# Patient Record
Sex: MALE | Race: BLACK OR AFRICAN AMERICAN | NOT HISPANIC OR LATINO | Employment: OTHER | URBAN - METROPOLITAN AREA
[De-identification: names, ages, dates, MRNs, and addresses within clinical notes are randomized per-mention and may not be internally consistent; named-entity substitution may affect disease eponyms.]

---

## 2017-03-29 ENCOUNTER — RESULTS ENCOUNTER (OUTPATIENT)
Dept: ONCOLOGY | Facility: CLINIC | Age: 67
End: 2017-03-29

## 2017-03-29 DIAGNOSIS — G89.4 CHRONIC PAIN SYNDROME: ICD-10-CM

## 2017-03-29 DIAGNOSIS — D47.2 IGG MONOCLONAL PROTEIN DISORDER: ICD-10-CM

## 2017-04-11 ENCOUNTER — LAB (OUTPATIENT)
Dept: LAB | Facility: HOSPITAL | Age: 67
End: 2017-04-11

## 2017-04-11 ENCOUNTER — OFFICE VISIT (OUTPATIENT)
Dept: ONCOLOGY | Facility: CLINIC | Age: 67
End: 2017-04-11

## 2017-04-11 VITALS
HEIGHT: 72 IN | DIASTOLIC BLOOD PRESSURE: 77 MMHG | HEART RATE: 47 BPM | TEMPERATURE: 97.6 F | WEIGHT: 163 LBS | SYSTOLIC BLOOD PRESSURE: 148 MMHG | RESPIRATION RATE: 14 BRPM | BODY MASS INDEX: 22.08 KG/M2

## 2017-04-11 DIAGNOSIS — D47.2 IGG MONOCLONAL PROTEIN DISORDER: Primary | ICD-10-CM

## 2017-04-11 DIAGNOSIS — G89.4 CHRONIC PAIN SYNDROME: ICD-10-CM

## 2017-04-11 DIAGNOSIS — I15.9 SECONDARY HYPERTENSION: ICD-10-CM

## 2017-04-11 LAB
ALBUMIN SERPL-MCNC: 4.6 G/DL (ref 3.2–4.8)
ALBUMIN/GLOB SERPL: 1.1 G/DL (ref 1.5–2.5)
ALP SERPL-CCNC: 61 U/L (ref 25–100)
ALT SERPL W P-5'-P-CCNC: 22 U/L (ref 7–40)
ANION GAP SERPL CALCULATED.3IONS-SCNC: -2 MMOL/L (ref 3–11)
AST SERPL-CCNC: 16 U/L (ref 0–33)
BILIRUB SERPL-MCNC: 0.6 MG/DL (ref 0.3–1.2)
BUN BLD-MCNC: 10 MG/DL (ref 9–23)
BUN/CREAT SERPL: 10 (ref 7–25)
CALCIUM SPEC-SCNC: 9.9 MG/DL (ref 8.7–10.4)
CHLORIDE SERPL-SCNC: 112 MMOL/L (ref 99–109)
CO2 SERPL-SCNC: 33 MMOL/L (ref 20–31)
CREAT BLD-MCNC: 1 MG/DL (ref 0.6–1.3)
ERYTHROCYTE [DISTWIDTH] IN BLOOD BY AUTOMATED COUNT: 14.8 % (ref 11.3–14.5)
GFR SERPL CREATININE-BSD FRML MDRD: 91 ML/MIN/1.73
GLOBULIN UR ELPH-MCNC: 4.1 GM/DL
GLUCOSE BLD-MCNC: 95 MG/DL (ref 70–100)
HCT VFR BLD AUTO: 44.5 % (ref 38.9–50.9)
HGB BLD-MCNC: 14.4 G/DL (ref 13.1–17.5)
LYMPHOCYTES # BLD AUTO: 1.5 10*3/MM3 (ref 0.6–4.8)
LYMPHOCYTES NFR BLD AUTO: 52.2 % (ref 24–44)
MCH RBC QN AUTO: 29.7 PG (ref 27–31)
MCHC RBC AUTO-ENTMCNC: 32.5 G/DL (ref 32–36)
MCV RBC AUTO: 91.5 FL (ref 80–99)
MONOCYTES # BLD AUTO: 0.2 10*3/MM3 (ref 0–1)
MONOCYTES NFR BLD AUTO: 6.2 % (ref 0–12)
NEUTROPHILS # BLD AUTO: 1.2 10*3/MM3 (ref 1.5–8.3)
NEUTROPHILS NFR BLD AUTO: 41.6 % (ref 41–71)
PLATELET # BLD AUTO: 291 10*3/MM3 (ref 150–450)
PMV BLD AUTO: 7.2 FL (ref 6–12)
POTASSIUM BLD-SCNC: 4.7 MMOL/L (ref 3.5–5.5)
PROT SERPL-MCNC: 8.7 G/DL (ref 5.7–8.2)
RBC # BLD AUTO: 4.86 10*6/MM3 (ref 4.2–5.76)
SODIUM BLD-SCNC: 143 MMOL/L (ref 132–146)
WBC NRBC COR # BLD: 2.8 10*3/MM3 (ref 3.5–10.8)

## 2017-04-11 PROCEDURE — 99213 OFFICE O/P EST LOW 20 MIN: CPT | Performed by: NURSE PRACTITIONER

## 2017-04-11 PROCEDURE — 84155 ASSAY OF PROTEIN SERUM: CPT

## 2017-04-11 PROCEDURE — 36415 COLL VENOUS BLD VENIPUNCTURE: CPT

## 2017-04-11 PROCEDURE — 80053 COMPREHEN METABOLIC PANEL: CPT

## 2017-04-11 PROCEDURE — 86334 IMMUNOFIX E-PHORESIS SERUM: CPT

## 2017-04-11 PROCEDURE — 85025 COMPLETE CBC W/AUTO DIFF WBC: CPT

## 2017-04-11 PROCEDURE — 84165 PROTEIN E-PHORESIS SERUM: CPT

## 2017-04-11 PROCEDURE — 83883 ASSAY NEPHELOMETRY NOT SPEC: CPT

## 2017-04-11 NOTE — PROGRESS NOTES
"      PROBLEM LIST:  1. Monoclonal protein diagnosed in  previously followed by Dr. Usama Jones, IgG:   a) Bone marrow biopsy on 03/10/2005 showed a normocellular bone marrow with  normal hematopoiesis with no evidence of multiple myeloma or lymphoma, plasma  cells 3%.   b) Reevaluation on 2014 showed a normal CBC, normal calcium, normal  kidney function, IgG lambda monoclonal protein of 1.6 g/dL, kappa lambda ratio  of 0.15, spot urine immunofixation with IgG lambda.   c) Repeat bone marrow biopsy on 2016 showed a normocellular marrow with  3 to 5% plasma cells. The plasma cells had a mild relative lambda excess.  There was no evidence of amyloid deposition on Congo red stain.  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  2. Chronic pain.   3. Hypertension.    Subjective     HISTORY OF PRESENT ILLNESS:   Jh Bryan returns for follow-up.   He says he has been having some pain in his right shoulder and behind the right shoulder blade for about a couple of months.  He still has normal range of motion but he just feels like an aching muscle. Otherwise he reports feeling tired.  He stopped working at the [employer].  He also complains of anxiety and depression.  His wife had a stroke and his mother in law  in Feb.  He stated he had an enlarged prostate and was taking OTC medications but it did not help.  He has not been back to see his primary care doctor.    Past Medical History, Past Surgical History, Social History, Family History have been reviewed and are without significant changes except as mentioned.    Review of Systems   A comprehensive 14 point review of systems was performed and was negative except as mentioned.    Medications:  The current medication list was reviewed in the EMR    ALLERGIES:  No Known Allergies    Objective      /77  Pulse (!) 47  Temp 97.6 °F (36.4 °C)  Resp 14  Ht 72\" (182.9 cm)  Wt 163 lb (73.9 kg)  BMI 22.11 kg/m2     Performance Status: 1    General: " well appearing, in no acute distress  HEENT: sclera anicteric, oropharynx clear  Lymphatics: no cervical, supraclavicular, or axillary adenopathy  Cardiovascular: regular rate and rhythm, no murmurs  Lungs: clear to auscultation bilaterally  Abdomen: Left upper quadrant pain and tenderness on palpation.  No palpable organomegaly  Extremeties: no lower extremity edema  Skin: no rashes, lesions, bruising, or petechiae  Musculoskeletal: There is no pain with palpation or percussion over the left shoulder blade, or ribs  Psych: mood and affect appropriate    RECENT LABS:  CBC is notable for WBC is 2.8 but this is stable when compared to previous labs, otherwise stable. I will call him with lab work when results are available.        Assessment/Plan   Jh Bryan is a 66 y.o. year old male with IgG monoclonal gammopathy who returns for six-month follow-up.  He is complaining of some right shoulder pain today and left upper quadrant pain that he states can cause spasms.   I encouraged him to make an appointment with his primary care doctor to discuss his concerns regarding shoulder pain, left upper quadrant pain, as well as anxiety. His lab work was not back today during his visit.  I will call him with the results. Follow up in 6 months as long as his monoclonal protein is stable.                  Visit time was 25 minutes, greater than 50% spent in counseling      Amy CHEUNG  [facility]    4/11/2017          CC:

## 2017-04-12 LAB
ALBUMIN SERPL-MCNC: 4.3 G/DL (ref 2.9–4.4)
ALBUMIN/GLOB SERPL: 1.1 {RATIO} (ref 0.7–1.7)
ALPHA1 GLOB FLD ELPH-MCNC: 0.3 G/DL (ref 0–0.4)
ALPHA2 GLOB SERPL ELPH-MCNC: 0.7 G/DL (ref 0.4–1)
B-GLOBULIN SERPL ELPH-MCNC: 1 G/DL (ref 0.7–1.3)
GAMMA GLOB SERPL ELPH-MCNC: 2.4 G/DL (ref 0.4–1.8)
GLOBULIN SER CALC-MCNC: 4.3 G/DL (ref 2.2–3.9)
IGA SERPL-MCNC: 33 MG/DL (ref 61–437)
IGG SERPL-MCNC: 2484 MG/DL (ref 700–1600)
IGM SERPL-MCNC: 24 MG/DL (ref 20–172)
INTERPRETATION SERPL IEP-IMP: ABNORMAL
KAPPA LC SERPL-MCNC: 10.21 MG/L (ref 3.3–19.4)
KAPPA LC/LAMBDA SER: 0.09 {RATIO} (ref 0.26–1.65)
LAMBDA LC FREE SERPL-MCNC: 113.9 MG/L (ref 5.71–26.3)
Lab: ABNORMAL
M-SPIKE: 2.1 G/DL
PROT SERPL-MCNC: 8.6 G/DL (ref 6–8.5)

## 2017-04-17 ENCOUNTER — TELEPHONE (OUTPATIENT)
Dept: INFUSION THERAPY | Facility: HOSPITAL | Age: 67
End: 2017-04-17

## 2017-04-17 NOTE — TELEPHONE ENCOUNTER
After multiple attempts by phone I spoke with Mr. Bryan today and explained that his M-spike is slightly higher than last time.  We will see him back in 3 months instead of 6 months.  I spoke with Luis in scheduling and she will call the patient to schedule a 3 month follow up visit.

## 2017-06-13 ENCOUNTER — OFFICE VISIT (OUTPATIENT)
Dept: ONCOLOGY | Facility: CLINIC | Age: 67
End: 2017-06-13

## 2017-06-13 ENCOUNTER — LAB (OUTPATIENT)
Dept: LAB | Facility: HOSPITAL | Age: 67
End: 2017-06-13

## 2017-06-13 VITALS
TEMPERATURE: 98 F | HEART RATE: 54 BPM | SYSTOLIC BLOOD PRESSURE: 191 MMHG | RESPIRATION RATE: 16 BRPM | HEIGHT: 72 IN | BODY MASS INDEX: 21.94 KG/M2 | WEIGHT: 162 LBS | DIASTOLIC BLOOD PRESSURE: 91 MMHG

## 2017-06-13 DIAGNOSIS — D47.2 IGG MONOCLONAL PROTEIN DISORDER: ICD-10-CM

## 2017-06-13 DIAGNOSIS — D47.2 IGG MONOCLONAL PROTEIN DISORDER: Primary | ICD-10-CM

## 2017-06-13 LAB
ALBUMIN SERPL-MCNC: 4.4 G/DL (ref 3.2–4.8)
ALBUMIN/GLOB SERPL: 1.1 G/DL (ref 1.5–2.5)
ALP SERPL-CCNC: 62 U/L (ref 25–100)
ALT SERPL W P-5'-P-CCNC: 20 U/L (ref 7–40)
ANION GAP SERPL CALCULATED.3IONS-SCNC: 2 MMOL/L (ref 3–11)
AST SERPL-CCNC: 19 U/L (ref 0–33)
BILIRUB SERPL-MCNC: 0.5 MG/DL (ref 0.3–1.2)
BUN BLD-MCNC: 11 MG/DL (ref 9–23)
BUN/CREAT SERPL: 12.2 (ref 7–25)
CALCIUM SPEC-SCNC: 9.7 MG/DL (ref 8.7–10.4)
CHLORIDE SERPL-SCNC: 109 MMOL/L (ref 99–109)
CO2 SERPL-SCNC: 30 MMOL/L (ref 20–31)
CREAT BLD-MCNC: 0.9 MG/DL (ref 0.6–1.3)
ERYTHROCYTE [DISTWIDTH] IN BLOOD BY AUTOMATED COUNT: 13.8 % (ref 11.3–14.5)
GFR SERPL CREATININE-BSD FRML MDRD: 103 ML/MIN/1.73
GLOBULIN UR ELPH-MCNC: 4 GM/DL
GLUCOSE BLD-MCNC: 102 MG/DL (ref 70–100)
HCT VFR BLD AUTO: 40.9 % (ref 38.9–50.9)
HGB BLD-MCNC: 13.5 G/DL (ref 13.1–17.5)
LYMPHOCYTES # BLD AUTO: 1.5 10*3/MM3 (ref 0.6–4.8)
LYMPHOCYTES NFR BLD AUTO: 50.2 % (ref 24–44)
MCH RBC QN AUTO: 29.7 PG (ref 27–31)
MCHC RBC AUTO-ENTMCNC: 33 G/DL (ref 32–36)
MCV RBC AUTO: 90.2 FL (ref 80–99)
MONOCYTES # BLD AUTO: 0.3 10*3/MM3 (ref 0–1)
MONOCYTES NFR BLD AUTO: 8.7 % (ref 0–12)
NEUTROPHILS # BLD AUTO: 1.2 10*3/MM3 (ref 1.5–8.3)
NEUTROPHILS NFR BLD AUTO: 41.1 % (ref 41–71)
PLATELET # BLD AUTO: 283 10*3/MM3 (ref 150–450)
PMV BLD AUTO: 7.2 FL (ref 6–12)
POTASSIUM BLD-SCNC: 4.1 MMOL/L (ref 3.5–5.5)
PROT SERPL-MCNC: 8.4 G/DL (ref 5.7–8.2)
RBC # BLD AUTO: 4.54 10*6/MM3 (ref 4.2–5.76)
SODIUM BLD-SCNC: 141 MMOL/L (ref 132–146)
WBC NRBC COR # BLD: 2.9 10*3/MM3 (ref 3.5–10.8)

## 2017-06-13 PROCEDURE — 84165 PROTEIN E-PHORESIS SERUM: CPT | Performed by: NURSE PRACTITIONER

## 2017-06-13 PROCEDURE — 83883 ASSAY NEPHELOMETRY NOT SPEC: CPT | Performed by: NURSE PRACTITIONER

## 2017-06-13 PROCEDURE — 85025 COMPLETE CBC W/AUTO DIFF WBC: CPT

## 2017-06-13 PROCEDURE — 86334 IMMUNOFIX E-PHORESIS SERUM: CPT | Performed by: NURSE PRACTITIONER

## 2017-06-13 PROCEDURE — 99214 OFFICE O/P EST MOD 30 MIN: CPT | Performed by: INTERNAL MEDICINE

## 2017-06-13 PROCEDURE — 36415 COLL VENOUS BLD VENIPUNCTURE: CPT

## 2017-06-13 PROCEDURE — 84155 ASSAY OF PROTEIN SERUM: CPT | Performed by: NURSE PRACTITIONER

## 2017-06-13 PROCEDURE — 80053 COMPREHEN METABOLIC PANEL: CPT | Performed by: NURSE PRACTITIONER

## 2017-06-13 NOTE — PROGRESS NOTES
"      PROBLEM LIST:  1. Monoclonal protein diagnosed in 2005 previously followed by Dr. Usama Jones, IgG:   a) Bone marrow biopsy on 03/10/2005 showed a normocellular bone marrow with  normal hematopoiesis with no evidence of multiple myeloma or lymphoma, plasma  cells 3%.   b) Reevaluation on 09/16/2014 showed a normal CBC, normal calcium, normal  kidney function, IgG lambda monoclonal protein of 1.6 g/dL, kappa lambda ratio  of 0.15, spot urine immunofixation with IgG lambda.   c) Repeat bone marrow biopsy on 03/01/2016 showed a normocellular marrow with  3 to 5% plasma cells. The plasma cells had a mild relative lambda excess.  There was no evidence of amyloid deposition on Congo red stain.  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  2. Chronic pain.   3. Hypertension.    Subjective     HISTORY OF PRESENT ILLNESS:   Jh Bryan returns for follow-up.   He says he is not feeling great.  His is having pain in his shoulders and legs, and also reports pain in his left low back.  He is frustrated about not getting his pain medications.  He also has not taken his blood pressure medicine for the past few weeks.    Past Medical History, Past Surgical History, Social History, Family History have been reviewed and are without significant changes except as mentioned.    Review of Systems   A comprehensive 14 point review of systems was performed and was negative except as mentioned.    Medications:  The current medication list was reviewed in the EMR    ALLERGIES:  No Known Allergies    Objective      BP (!) 191/91 Comment: Dr العلي stopped medications, pt aggravated  Pulse 54  Temp 98 °F (36.7 °C) (Temporal Artery )   Resp 16  Ht 72\" (182.9 cm)  Wt 162 lb (73.5 kg)  BMI 21.97 kg/m2     Performance Status: 1    General: well appearing, in no acute distress  HEENT: sclera anicteric, oropharynx clear  Lymphatics: no cervical, supraclavicular, or axillary adenopathy  Cardiovascular: regular rate and rhythm, no " murmurs  Lungs: clear to auscultation bilaterally  Abdomen: Left upper quadrant pain and tenderness on palpation.  No palpable organomegaly  Extremeties: no lower extremity edema  Skin: no rashes, lesions, bruising, or petechiae  Musculoskeletal: There is no pain with palpation or percussion over the left shoulder blade, or ribs  Psych: mood and affect appropriate    RECENT LABS:  Wbc 2.9, hgb 13.5, plt 283.  Ca 9.7, Cr 0.9.  Total protein 8.4        Assessment/Plan   Jh Bryan is a 66 y.o. year old male with IgG monoclonal gammopathy who returns for 3 month follow up.  His last M-spike was slightly increased.  Today his total protein is stable, but we will await the SPEP.  If it is increasing I will plan to repeat a skeletal survey or possibly a bone marrow biopsy.  At this point he has never met criteria for a diagnosis of myeloma.  If his M-spike is stable we will see him and check it again in 3 months.    We discussed that he needs to follow up with his PCP regarding his blood pressure medications.                    Visit time was 25 minutes, greater than 50% spent in counseling      6/13/2017          CC:

## 2017-06-14 LAB
ALBUMIN SERPL-MCNC: 4.2 G/DL (ref 2.9–4.4)
ALBUMIN/GLOB SERPL: 1 {RATIO} (ref 0.7–1.7)
ALPHA1 GLOB FLD ELPH-MCNC: 0.2 G/DL (ref 0–0.4)
ALPHA2 GLOB SERPL ELPH-MCNC: 0.6 G/DL (ref 0.4–1)
B-GLOBULIN SERPL ELPH-MCNC: 1 G/DL (ref 0.7–1.3)
GAMMA GLOB SERPL ELPH-MCNC: 2.4 G/DL (ref 0.4–1.8)
GLOBULIN SER CALC-MCNC: 4.3 G/DL (ref 2.2–3.9)
IGA SERPL-MCNC: 32 MG/DL (ref 61–437)
IGG SERPL-MCNC: 2608 MG/DL (ref 700–1600)
IGM SERPL-MCNC: 26 MG/DL (ref 20–172)
INTERPRETATION SERPL IEP-IMP: ABNORMAL
KAPPA LC SERPL-MCNC: 8.5 MG/L (ref 3.3–19.4)
KAPPA LC/LAMBDA SER: 0.07 {RATIO} (ref 0.26–1.65)
LAMBDA LC FREE SERPL-MCNC: 129.1 MG/L (ref 5.7–26.3)
Lab: ABNORMAL
M-SPIKE: 2.1 G/DL
PROT SERPL-MCNC: 8.5 G/DL (ref 6–8.5)

## 2017-06-16 ENCOUNTER — TELEPHONE (OUTPATIENT)
Dept: ONCOLOGY | Facility: CLINIC | Age: 67
End: 2017-06-16

## 2017-06-16 NOTE — TELEPHONE ENCOUNTER
I spoke with Mr. Bryan and informed him that his labs are stable and we will see him in 3 months.  He has followed up with his PCP and has started back on his blood pressure medication, a muscle relaxer, and an anti inflammatory medication.  He will follow up with his PCP next week.

## 2017-08-22 ENCOUNTER — HOSPITAL ENCOUNTER (EMERGENCY)
Facility: HOSPITAL | Age: 67
Discharge: HOME | End: 2017-08-22
Attending: EMERGENCY MEDICINE | Admitting: EMERGENCY MEDICINE

## 2017-08-22 VITALS
WEIGHT: 165 LBS | DIASTOLIC BLOOD PRESSURE: 74 MMHG | BODY MASS INDEX: 22.35 KG/M2 | HEIGHT: 72 IN | HEART RATE: 51 BPM | RESPIRATION RATE: 20 BRPM | TEMPERATURE: 98 F | SYSTOLIC BLOOD PRESSURE: 152 MMHG | OXYGEN SATURATION: 100 %

## 2017-08-22 DIAGNOSIS — T63.441A BEE STING REACTION, ACCIDENTAL OR UNINTENTIONAL, INITIAL ENCOUNTER: Primary | ICD-10-CM

## 2017-08-22 PROCEDURE — 99282 EMERGENCY DEPT VISIT SF MDM: CPT

## 2017-08-22 RX ORDER — METHYLPREDNISOLONE 4 MG/1
TABLET ORAL
Qty: 21 TABLET | Refills: 0 | OUTPATIENT
Start: 2017-08-22 | End: 2020-03-27

## 2017-08-22 NOTE — DISCHARGE INSTRUCTIONS
Continue benadryl as directed.  Medrol dose pack as prescribed.  Apply ice bag off/on as needed.  Return if worse.

## 2017-08-22 NOTE — ED PROVIDER NOTES
Subjective   HPI Comments: 67-year-old male presents to the emergency department with complaints of swelling to the right facial cheek, both hands and right lower leg after being stung multiple times by bees yesterday while cutting the grass.  He denies any shortness of breath or swelling of his tongue or throat.  He has been taking Benadryl with little to no relief.  No known allergy to bee stings.  Past medical history of hypertension and monoclonal gammopathy, followed by Dr. Balderas.      Patient is a 67 y.o. male presenting with allergic reaction.   Allergic Reaction   Presenting symptoms: swelling (right face, hands, right ankle)    Presenting symptoms: no rash and no wheezing    Severity:  Moderate  Duration: onset yesterday after being stung by bees.  Context: insect bite/sting    Relieved by:  Nothing  Worsened by:  Nothing  Ineffective treatments:  Antihistamines      Review of Systems   Constitutional: Negative for chills and fever.   HENT: Negative for congestion, ear pain, nosebleeds, rhinorrhea and sore throat.         Swelling of right facial cheek     Eyes: Negative for pain, discharge and visual disturbance.   Respiratory: Negative for cough, shortness of breath and wheezing.    Cardiovascular: Negative for chest pain, palpitations and leg swelling.   Gastrointestinal: Negative for abdominal pain, blood in stool, diarrhea, nausea and vomiting.   Endocrine: Negative.    Genitourinary: Negative for dysuria, hematuria and urgency.   Musculoskeletal: Negative for arthralgias and back pain.   Skin: Negative for pallor and rash.   Allergic/Immunologic: Negative for immunocompromised state.   Neurological: Negative for dizziness, speech difficulty, weakness and headaches.   Hematological: Negative for adenopathy. Does not bruise/bleed easily.   Psychiatric/Behavioral: Negative.        Past Medical History:   Diagnosis Date   • Multiple myeloma        No Known Allergies    Past Surgical History:   Procedure  Laterality Date   • BONE MARROW BIOPSY  03/10/2005    Showed normocellular bone marrow with normal hematpoiesis.   • TOOTH EXTRACTION Right 11/2016    bottom right tooth 2 or 3rd from the middle to the right       History reviewed. No pertinent family history.    Social History     Social History   • Marital status:      Spouse name: N/A   • Number of children: N/A   • Years of education: N/A     Social History Main Topics   • Smoking status: Former Smoker   • Smokeless tobacco: Never Used   • Alcohol use No   • Drug use: Yes     Special: Marijuana      Comment: Daily   • Sexual activity: Not Asked     Other Topics Concern   • None     Social History Narrative   • None           Objective   Physical Exam   Constitutional: He is oriented to person, place, and time. He appears well-developed and well-nourished. No distress.   HENT:   Nose: Nose normal.   Mouth/Throat: Oropharynx is clear and moist.   Moderate swelling to right facial cheek and periorbital region.  No cellulitis.     Eyes: EOM are normal. Pupils are equal, round, and reactive to light. Left eye exhibits no discharge. No scleral icterus.   Neck: Normal range of motion. Neck supple.   Cardiovascular: Normal rate, regular rhythm and normal heart sounds.    No murmur heard.  Pulmonary/Chest: Effort normal and breath sounds normal. No respiratory distress. He has no wheezes. He has no rales. He exhibits no tenderness.   Abdominal: Soft. Bowel sounds are normal. There is no tenderness.   Musculoskeletal: Normal range of motion. He exhibits no edema or tenderness.   Mild swelling of both hands, R>L with no cellulitis.  Mild swelling lateral aspect of right ankle.     Neurological: He is alert and oriented to person, place, and time.   Skin: Skin is warm and dry. No rash noted. He is not diaphoretic.   No cellulitis.   Psychiatric: He has a normal mood and affect.   Nursing note and vitals reviewed.      Procedures         ED Course  ED Course      The  patient has mild-to-moderate swelling associated with localized inflammatory reaction from bee stings.  No anaphylaxis.  No oral edema.  No difficulty breathing.  I think he may benefit from a course of oral steroids for a few days while continuing his Benadryl.  I have recommended that he apply ice bags off and on to the face.            MDM    Final diagnoses:   Bee sting reaction, accidental or unintentional, initial encounter            ZONIA Lopez  08/22/17 [id number]

## 2017-09-12 ENCOUNTER — OFFICE VISIT (OUTPATIENT)
Dept: ONCOLOGY | Facility: CLINIC | Age: 67
End: 2017-09-12

## 2017-09-12 ENCOUNTER — HOSPITAL ENCOUNTER (OUTPATIENT)
Dept: GENERAL RADIOLOGY | Facility: HOSPITAL | Age: 67
Discharge: HOME | End: 2017-09-12
Attending: INTERNAL MEDICINE | Admitting: INTERNAL MEDICINE

## 2017-09-12 ENCOUNTER — LAB (OUTPATIENT)
Dept: LAB | Facility: HOSPITAL | Age: 67
End: 2017-09-12

## 2017-09-12 VITALS
BODY MASS INDEX: 22.35 KG/M2 | DIASTOLIC BLOOD PRESSURE: 67 MMHG | TEMPERATURE: 97.4 F | HEART RATE: 50 BPM | SYSTOLIC BLOOD PRESSURE: 142 MMHG | WEIGHT: 165 LBS | RESPIRATION RATE: 18 BRPM | HEIGHT: 72 IN

## 2017-09-12 DIAGNOSIS — D47.2 IGG MONOCLONAL PROTEIN DISORDER: Primary | ICD-10-CM

## 2017-09-12 DIAGNOSIS — D47.2 IGG MONOCLONAL PROTEIN DISORDER: ICD-10-CM

## 2017-09-12 LAB
ALBUMIN SERPL-MCNC: 4.4 G/DL (ref 3.2–4.8)
ALBUMIN/GLOB SERPL: 1.1 G/DL (ref 1.5–2.5)
ALP SERPL-CCNC: 67 U/L (ref 25–100)
ALT SERPL W P-5'-P-CCNC: 24 U/L (ref 7–40)
ANION GAP SERPL CALCULATED.3IONS-SCNC: 2 MMOL/L (ref 3–11)
AST SERPL-CCNC: 19 U/L (ref 0–33)
BASOPHILS # BLD AUTO: 0.02 10*3/MM3 (ref 0–0.2)
BASOPHILS NFR BLD AUTO: 0.6 % (ref 0–1)
BILIRUB SERPL-MCNC: 0.7 MG/DL (ref 0.3–1.2)
BUN BLD-MCNC: 9 MG/DL (ref 9–23)
BUN/CREAT SERPL: 11.3 (ref 7–25)
CALCIUM SPEC-SCNC: 9.4 MG/DL (ref 8.7–10.4)
CHLORIDE SERPL-SCNC: 109 MMOL/L (ref 99–109)
CO2 SERPL-SCNC: 31 MMOL/L (ref 20–31)
CREAT BLD-MCNC: 0.8 MG/DL (ref 0.6–1.3)
DEPRECATED RDW RBC AUTO: 46.7 FL (ref 37–54)
EOSINOPHIL # BLD AUTO: 0.21 10*3/MM3 (ref 0–0.3)
EOSINOPHIL NFR BLD AUTO: 6.3 % (ref 0–3)
ERYTHROCYTE [DISTWIDTH] IN BLOOD BY AUTOMATED COUNT: 13.9 % (ref 11.3–14.5)
GFR SERPL CREATININE-BSD FRML MDRD: 117 ML/MIN/1.73
GLOBULIN UR ELPH-MCNC: 4.1 GM/DL
GLUCOSE BLD-MCNC: 101 MG/DL (ref 70–100)
HCT VFR BLD AUTO: 42 % (ref 38.9–50.9)
HGB BLD-MCNC: 13.8 G/DL (ref 13.1–17.5)
IMM GRANULOCYTES # BLD: 0.01 10*3/MM3 (ref 0–0.03)
IMM GRANULOCYTES NFR BLD: 0.3 % (ref 0–0.6)
LYMPHOCYTES # BLD AUTO: 1.48 10*3/MM3 (ref 0.6–4.8)
LYMPHOCYTES NFR BLD AUTO: 44.7 % (ref 24–44)
MCH RBC QN AUTO: 30.2 PG (ref 27–31)
MCHC RBC AUTO-ENTMCNC: 32.9 G/DL (ref 32–36)
MCV RBC AUTO: 91.9 FL (ref 80–99)
MONOCYTES # BLD AUTO: 0.3 10*3/MM3 (ref 0–1)
MONOCYTES NFR BLD AUTO: 9.1 % (ref 0–12)
NEUTROPHILS # BLD AUTO: 1.29 10*3/MM3 (ref 1.5–8.3)
NEUTROPHILS NFR BLD AUTO: 39 % (ref 41–71)
PLATELET # BLD AUTO: 279 10*3/MM3 (ref 150–450)
PMV BLD AUTO: 9.3 FL (ref 6–12)
POTASSIUM BLD-SCNC: 4.4 MMOL/L (ref 3.5–5.5)
PROT SERPL-MCNC: 8.5 G/DL (ref 5.7–8.2)
RBC # BLD AUTO: 4.57 10*6/MM3 (ref 4.2–5.76)
SODIUM BLD-SCNC: 142 MMOL/L (ref 132–146)
WBC NRBC COR # BLD: 3.31 10*3/MM3 (ref 3.5–10.8)

## 2017-09-12 PROCEDURE — 86334 IMMUNOFIX E-PHORESIS SERUM: CPT | Performed by: NURSE PRACTITIONER

## 2017-09-12 PROCEDURE — 85025 COMPLETE CBC W/AUTO DIFF WBC: CPT | Performed by: NURSE PRACTITIONER

## 2017-09-12 PROCEDURE — 77075 RADEX OSSEOUS SURVEY COMPL: CPT

## 2017-09-12 PROCEDURE — 80053 COMPREHEN METABOLIC PANEL: CPT | Performed by: NURSE PRACTITIONER

## 2017-09-12 PROCEDURE — 84165 PROTEIN E-PHORESIS SERUM: CPT | Performed by: NURSE PRACTITIONER

## 2017-09-12 PROCEDURE — 99213 OFFICE O/P EST LOW 20 MIN: CPT | Performed by: INTERNAL MEDICINE

## 2017-09-12 PROCEDURE — 84155 ASSAY OF PROTEIN SERUM: CPT | Performed by: NURSE PRACTITIONER

## 2017-09-12 PROCEDURE — 36415 COLL VENOUS BLD VENIPUNCTURE: CPT

## 2017-09-12 RX ORDER — TIZANIDINE 4 MG/1
TABLET ORAL
Refills: 0 | COMMUNITY
Start: 2017-06-13 | End: 2020-03-27

## 2017-09-12 RX ORDER — BUSPIRONE HYDROCHLORIDE 5 MG/1
TABLET ORAL
Refills: 0 | COMMUNITY
Start: 2017-08-27 | End: 2020-03-27

## 2017-09-12 RX ORDER — CLONIDINE HYDROCHLORIDE 0.1 MG/1
TABLET ORAL
Refills: 0 | COMMUNITY
Start: 2017-08-27 | End: 2020-03-27

## 2017-09-12 NOTE — PROGRESS NOTES
"      PROBLEM LIST:  1. Monoclonal protein diagnosed in 2005 previously followed by Dr. Usama Jones, IgG:   a) Bone marrow biopsy on 03/10/2005 showed a normocellular bone marrow with  normal hematopoiesis with no evidence of multiple myeloma or lymphoma, plasma  cells 3%.   b) Reevaluation on 09/16/2014 showed a normal CBC, normal calcium, normal  kidney function, IgG lambda monoclonal protein of 1.6 g/dL, kappa lambda ratio  of 0.15, spot urine immunofixation with IgG lambda.   c) Repeat bone marrow biopsy on 03/01/2016 showed a normocellular marrow with  3 to 5% plasma cells. The plasma cells had a mild relative lambda excess.  There was no evidence of amyloid deposition on Congo red stain.  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  2. Chronic pain.   3. Hypertension.    Subjective     HISTORY OF PRESENT ILLNESS:   Jh Bryan returns for follow-up.    He denies any new bone pain.  He was hospitalized in [place] a few weeks ago for a severe reaction to multiple bee stings.   Otherwise he reports feeling weak and tired but this is unchanged.    Past Medical History, Past Surgical History, Social History, Family History have been reviewed and are without significant changes except as mentioned.    Review of Systems   A comprehensive 14 point review of systems was performed and was negative except as mentioned.    Medications:  The current medication list was reviewed in the EMR    ALLERGIES:  No Known Allergies    Objective      Ht 72\" (182.9 cm)     Performance Status: 1    General: well appearing, in no acute distress  HEENT: sclera anicteric, oropharynx clear  Lymphatics: no cervical, supraclavicular, or axillary adenopathy  Cardiovascular: regular rate and rhythm, no murmurs  Lungs: clear to auscultation bilaterally  Abdomen: Left upper quadrant pain and tenderness on palpation.  No palpable organomegaly  Extremeties: no lower extremity edema  Skin: no rashes, lesions, bruising, or petechiae  Psych: mood " and affect appropriate    Repeat labs today are pending.      Assessment/Plan   Jh Bryan is a 67 y.o. year old male with IgG monoclonal gammopathy who returns for 3 month follow up.  He has had a gradual increase in his M-protein over the past several months, but no change in symptoms that suggest progression to myeloma.  We will repeat a skeletal survey now.  If today's labs are stable/increasing we will plan a repeat bone marrow biopsy.  I will contact him when his lab results are available.  Follow up will be scheduled based on today's SPEP/DELMER.              Visit time was 15 minutes, greater than 50% spent in counseling      9/12/2017          CC:

## 2017-09-13 LAB
ALBUMIN SERPL-MCNC: 4.6 G/DL (ref 2.9–4.4)
ALBUMIN/GLOB SERPL: 1.3 {RATIO} (ref 0.7–1.7)
ALPHA1 GLOB FLD ELPH-MCNC: 0.2 G/DL (ref 0–0.4)
ALPHA2 GLOB SERPL ELPH-MCNC: 0.5 G/DL (ref 0.4–1)
B-GLOBULIN SERPL ELPH-MCNC: 0.9 G/DL (ref 0.7–1.3)
GAMMA GLOB SERPL ELPH-MCNC: 2.2 G/DL (ref 0.4–1.8)
GLOBULIN SER CALC-MCNC: 3.8 G/DL (ref 2.2–3.9)
IGA SERPL-MCNC: 30 MG/DL (ref 61–437)
IGG SERPL-MCNC: 2438 MG/DL (ref 700–1600)
IGM SERPL-MCNC: 25 MG/DL (ref 20–172)
INTERPRETATION SERPL IEP-IMP: ABNORMAL
Lab: ABNORMAL
M-SPIKE: 1.8 G/DL
PROT SERPL-MCNC: 8.4 G/DL (ref 6–8.5)

## 2017-09-15 ENCOUNTER — TELEPHONE (OUTPATIENT)
Dept: ONCOLOGY | Facility: CLINIC | Age: 67
End: 2017-09-15

## 2017-09-15 DIAGNOSIS — D47.2 IGG MONOCLONAL PROTEIN DISORDER: Primary | ICD-10-CM

## 2017-09-15 NOTE — TELEPHONE ENCOUNTER
----- Message from David Mae sent at 9/15/2017 11:37 AM EDT -----  Regarding: Marvin, patient wants lab results  Contact: [phone]  Patient wants the results from his labwork

## 2017-09-15 NOTE — TELEPHONE ENCOUNTER
Called and spoke with patient.     Let him know:  -bone survey is normal, no evidence of myeloma involving the bones.   -cbc and cmp normal  -M-spike has decreased from 2.1 to 1.8, which is good.     Plan:  -recheck labs only in 3 months, around December 12th-- DELMER+PE    -rechek labs/ and clinic appt in 6 months-- labs: cbc, cmp, and DELMER+PE on March 6th, clinic appt to see Dr. Wong on Tuesday March 13th @688.

## 2017-12-12 ENCOUNTER — LAB (OUTPATIENT)
Dept: LAB | Facility: HOSPITAL | Age: 67
End: 2017-12-12

## 2017-12-12 DIAGNOSIS — D47.2 IGG MONOCLONAL PROTEIN DISORDER: Primary | ICD-10-CM

## 2017-12-12 DIAGNOSIS — D47.2 IGG MONOCLONAL PROTEIN DISORDER: ICD-10-CM

## 2017-12-12 LAB
ALBUMIN SERPL-MCNC: 4.4 G/DL (ref 3.2–4.8)
ALBUMIN/GLOB SERPL: 1.1 G/DL (ref 1.5–2.5)
ALP SERPL-CCNC: 74 U/L (ref 25–100)
ALT SERPL W P-5'-P-CCNC: 31 U/L (ref 7–40)
ANION GAP SERPL CALCULATED.3IONS-SCNC: 5 MMOL/L (ref 3–11)
AST SERPL-CCNC: 19 U/L (ref 0–33)
BILIRUB SERPL-MCNC: 0.6 MG/DL (ref 0.3–1.2)
BUN BLD-MCNC: 9 MG/DL (ref 9–23)
BUN/CREAT SERPL: 10 (ref 7–25)
CALCIUM SPEC-SCNC: 8.9 MG/DL (ref 8.7–10.4)
CHLORIDE SERPL-SCNC: 103 MMOL/L (ref 99–109)
CO2 SERPL-SCNC: 28 MMOL/L (ref 20–31)
CREAT BLD-MCNC: 0.9 MG/DL (ref 0.6–1.3)
ERYTHROCYTE [DISTWIDTH] IN BLOOD BY AUTOMATED COUNT: 14.7 % (ref 11.3–14.5)
GFR SERPL CREATININE-BSD FRML MDRD: 102 ML/MIN/1.73
GLOBULIN UR ELPH-MCNC: 4 GM/DL
GLUCOSE BLD-MCNC: 77 MG/DL (ref 70–100)
HCT VFR BLD AUTO: 43.4 % (ref 38.9–50.9)
HGB BLD-MCNC: 13.9 G/DL (ref 13.1–17.5)
LYMPHOCYTES # BLD AUTO: 1.7 10*3/MM3 (ref 0.6–4.8)
LYMPHOCYTES NFR BLD AUTO: 60.1 % (ref 24–44)
MCH RBC QN AUTO: 29.1 PG (ref 27–31)
MCHC RBC AUTO-ENTMCNC: 32 G/DL (ref 32–36)
MCV RBC AUTO: 91 FL (ref 80–99)
MONOCYTES # BLD AUTO: 0.2 10*3/MM3 (ref 0–1)
MONOCYTES NFR BLD AUTO: 7.1 % (ref 0–12)
NEUTROPHILS # BLD AUTO: 1 10*3/MM3 (ref 1.5–8.3)
NEUTROPHILS NFR BLD AUTO: 32.8 % (ref 41–71)
PLATELET # BLD AUTO: 296 10*3/MM3 (ref 150–450)
PMV BLD AUTO: 7.5 FL (ref 6–12)
POTASSIUM BLD-SCNC: 4.2 MMOL/L (ref 3.5–5.5)
PROT SERPL-MCNC: 8.4 G/DL (ref 5.7–8.2)
RBC # BLD AUTO: 4.77 10*6/MM3 (ref 4.2–5.76)
SODIUM BLD-SCNC: 136 MMOL/L (ref 132–146)
WBC NRBC COR # BLD: 2.9 10*3/MM3 (ref 3.5–10.8)

## 2017-12-12 PROCEDURE — 83883 ASSAY NEPHELOMETRY NOT SPEC: CPT

## 2017-12-12 PROCEDURE — 85025 COMPLETE CBC W/AUTO DIFF WBC: CPT

## 2017-12-12 PROCEDURE — 86334 IMMUNOFIX E-PHORESIS SERUM: CPT

## 2017-12-12 PROCEDURE — 80053 COMPREHEN METABOLIC PANEL: CPT

## 2017-12-12 PROCEDURE — 36415 COLL VENOUS BLD VENIPUNCTURE: CPT

## 2017-12-12 PROCEDURE — 84165 PROTEIN E-PHORESIS SERUM: CPT

## 2017-12-13 LAB
ALBUMIN SERPL-MCNC: 4.2 G/DL (ref 2.9–4.4)
ALBUMIN/GLOB SERPL: 1 {RATIO} (ref 0.7–1.7)
ALPHA1 GLOB FLD ELPH-MCNC: 0.3 G/DL (ref 0–0.4)
ALPHA2 GLOB SERPL ELPH-MCNC: 0.6 G/DL (ref 0.4–1)
B-GLOBULIN SERPL ELPH-MCNC: 1 G/DL (ref 0.7–1.3)
GAMMA GLOB SERPL ELPH-MCNC: 2.5 G/DL (ref 0.4–1.8)
GLOBULIN SER CALC-MCNC: 4.5 G/DL (ref 2.2–3.9)
IGA SERPL-MCNC: 57 MG/DL (ref 61–437)
IGG SERPL-MCNC: 2887 MG/DL (ref 700–1600)
IGM SERPL-MCNC: 73 MG/DL (ref 20–172)
INTERPRETATION SERPL IEP-IMP: ABNORMAL
KAPPA LC SERPL-MCNC: 11.6 MG/L (ref 3.3–19.4)
KAPPA LC/LAMBDA SER: 0.09 {RATIO} (ref 0.26–1.65)
LAMBDA LC FREE SERPL-MCNC: 126.1 MG/L (ref 5.7–26.3)
Lab: ABNORMAL
M-SPIKE: 2.2 G/DL
PROT SERPL-MCNC: 8.7 G/DL (ref 6–8.5)

## 2018-03-13 ENCOUNTER — OFFICE VISIT (OUTPATIENT)
Dept: ONCOLOGY | Facility: CLINIC | Age: 68
End: 2018-03-13

## 2018-03-13 ENCOUNTER — LAB (OUTPATIENT)
Dept: LAB | Facility: HOSPITAL | Age: 68
End: 2018-03-13

## 2018-03-13 VITALS
HEIGHT: 72 IN | WEIGHT: 174 LBS | RESPIRATION RATE: 18 BRPM | TEMPERATURE: 97.5 F | HEART RATE: 50 BPM | DIASTOLIC BLOOD PRESSURE: 68 MMHG | BODY MASS INDEX: 23.57 KG/M2 | SYSTOLIC BLOOD PRESSURE: 119 MMHG

## 2018-03-13 DIAGNOSIS — D47.2 IGG MONOCLONAL PROTEIN DISORDER: Primary | ICD-10-CM

## 2018-03-13 DIAGNOSIS — D47.2 IGG MONOCLONAL PROTEIN DISORDER: ICD-10-CM

## 2018-03-13 LAB
ALBUMIN SERPL-MCNC: 4.3 G/DL (ref 3.2–4.8)
ALBUMIN/GLOB SERPL: 1.1 G/DL (ref 1.5–2.5)
ALP SERPL-CCNC: 60 U/L (ref 25–100)
ALT SERPL W P-5'-P-CCNC: 18 U/L (ref 7–40)
ANION GAP SERPL CALCULATED.3IONS-SCNC: 3 MMOL/L (ref 3–11)
AST SERPL-CCNC: 15 U/L (ref 0–33)
BILIRUB SERPL-MCNC: 0.5 MG/DL (ref 0.3–1.2)
BUN BLD-MCNC: 12 MG/DL (ref 9–23)
BUN/CREAT SERPL: 13.3 (ref 7–25)
CALCIUM SPEC-SCNC: 9 MG/DL (ref 8.7–10.4)
CHLORIDE SERPL-SCNC: 108 MMOL/L (ref 99–109)
CO2 SERPL-SCNC: 28 MMOL/L (ref 20–31)
CREAT BLD-MCNC: 0.9 MG/DL (ref 0.6–1.3)
ERYTHROCYTE [DISTWIDTH] IN BLOOD BY AUTOMATED COUNT: 15 % (ref 11.3–14.5)
GFR SERPL CREATININE-BSD FRML MDRD: 102 ML/MIN/1.73
GLOBULIN UR ELPH-MCNC: 4 GM/DL
GLUCOSE BLD-MCNC: 82 MG/DL (ref 70–100)
HCT VFR BLD AUTO: 42.2 % (ref 38.9–50.9)
HGB BLD-MCNC: 13.7 G/DL (ref 13.1–17.5)
LYMPHOCYTES # BLD AUTO: 1.2 10*3/MM3 (ref 0.6–4.8)
LYMPHOCYTES NFR BLD AUTO: 54.8 % (ref 24–44)
MCH RBC QN AUTO: 29.3 PG (ref 27–31)
MCHC RBC AUTO-ENTMCNC: 32.4 G/DL (ref 32–36)
MCV RBC AUTO: 90.5 FL (ref 80–99)
MONOCYTES # BLD AUTO: 0.1 10*3/MM3 (ref 0–1)
MONOCYTES NFR BLD AUTO: 5.9 % (ref 0–12)
NEUTROPHILS # BLD AUTO: 0.8 10*3/MM3 (ref 1.5–8.3)
NEUTROPHILS NFR BLD AUTO: 39.3 % (ref 41–71)
PLATELET # BLD AUTO: 247 10*3/MM3 (ref 150–450)
PMV BLD AUTO: 7.5 FL (ref 6–12)
POTASSIUM BLD-SCNC: 4.1 MMOL/L (ref 3.5–5.5)
PROT SERPL-MCNC: 8.3 G/DL (ref 5.7–8.2)
RBC # BLD AUTO: 4.66 10*6/MM3 (ref 4.2–5.76)
SODIUM BLD-SCNC: 139 MMOL/L (ref 132–146)
WBC NRBC COR # BLD: 2.1 10*3/MM3 (ref 3.5–10.8)

## 2018-03-13 PROCEDURE — 84165 PROTEIN E-PHORESIS SERUM: CPT | Performed by: INTERNAL MEDICINE

## 2018-03-13 PROCEDURE — 99213 OFFICE O/P EST LOW 20 MIN: CPT | Performed by: INTERNAL MEDICINE

## 2018-03-13 PROCEDURE — 36415 COLL VENOUS BLD VENIPUNCTURE: CPT | Performed by: INTERNAL MEDICINE

## 2018-03-13 PROCEDURE — 86334 IMMUNOFIX E-PHORESIS SERUM: CPT | Performed by: INTERNAL MEDICINE

## 2018-03-13 PROCEDURE — 80053 COMPREHEN METABOLIC PANEL: CPT | Performed by: INTERNAL MEDICINE

## 2018-03-13 PROCEDURE — 83883 ASSAY NEPHELOMETRY NOT SPEC: CPT

## 2018-03-13 PROCEDURE — 82784 ASSAY IGA/IGD/IGG/IGM EACH: CPT | Performed by: INTERNAL MEDICINE

## 2018-03-13 PROCEDURE — 85025 COMPLETE CBC W/AUTO DIFF WBC: CPT | Performed by: INTERNAL MEDICINE

## 2018-03-13 NOTE — PROGRESS NOTES
"      PROBLEM LIST:  1. Monoclonal protein diagnosed in 2005 previously followed by Dr. Usama Jones, IgG:   a) Bone marrow biopsy on 03/10/2005 showed a normocellular bone marrow with  normal hematopoiesis with no evidence of multiple myeloma or lymphoma, plasma  cells 3%.   b) Reevaluation on 09/16/2014 showed a normal CBC, normal calcium, normal  kidney function, IgG lambda monoclonal protein of 1.6 g/dL, kappa lambda ratio  of 0.15, spot urine immunofixation with IgG lambda.   c) Repeat bone marrow biopsy on 03/01/2016 showed a normocellular marrow with  3 to 5% plasma cells. The plasma cells had a mild relative lambda excess.  There was no evidence of amyloid deposition on Congo red stain.  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  2. Chronic pain.   3. Hypertension.    Subjective     HISTORY OF PRESENT ILLNESS:   Jh Bryan returns for follow-up.    He reports he's feeling about the same.  He still has fatigue.  He also says he is struggling with depression.  He was recently started on BuSpar by Dr. Stout.  He is not sure if it is helping.  He denies any new bone pain.  He does mention some left upper quadrant pain in his abdomen.  It is not associated with eating, but is somewhat relieved by bowel movements.    Past Medical History, Past Surgical History, Social History, Family History have been reviewed and are without significant changes except as mentioned.    Review of Systems   A comprehensive 14 point review of systems was performed and was negative except as mentioned.    Medications:  The current medication list was reviewed in the EMR    ALLERGIES:  No Known Allergies    Objective      /68 Comment: LUE  Pulse 50   Temp 97.5 °F (36.4 °C) (Temporal Artery )   Resp 18   Ht 182.9 cm (72\")   Wt 78.9 kg (174 lb)   BMI 23.60 kg/m²      Performance Status: 1    General: well appearing, in no acute distress  HEENT: sclera anicteric, oropharynx clear  Lymphatics: no cervical, supraclavicular, or " axillary adenopathy  Cardiovascular: regular rate and rhythm, no murmurs  Lungs: clear to auscultation bilaterally  Abdomen: Mild left upper quadrant tenderness.  No palpable mass.  No palpable splenomegaly.  Extremeties: no lower extremity edema  Skin: no rashes, lesions, bruising, or petechiae  Psych: mood and affect appropriate    Labs:  M spike 12/12/17 is 2.2 gm.  Labs otherwise stable.      Assessment/Plan   Jh Bryan is a 67 y.o. year old male with IgG monoclonal gammopathy who returns for 6 month follow up.  We will repeat his labs and serum protein electrophoresis today.  As long as his monoclonal protein is relatively stable and there are no other lab abnormalities we will continue to check every 6 months.    For his left upper quadrant pain this is been present for over 6 months without significant change.  It may be related to constipation as it is relieved by bowel movements.  I also suggested he try taking an over-the-counter antacid to see if this provides any symptom relief.            Visit time was 15 minutes, greater than 50% spent in counseling      3/13/2018          CC:

## 2018-03-14 LAB
ALBUMIN SERPL-MCNC: 4.4 G/DL (ref 2.9–4.4)
ALBUMIN/GLOB SERPL: 1.2 {RATIO} (ref 0.7–1.7)
ALPHA1 GLOB FLD ELPH-MCNC: 0.2 G/DL (ref 0–0.4)
ALPHA2 GLOB SERPL ELPH-MCNC: 0.5 G/DL (ref 0.4–1)
B-GLOBULIN SERPL ELPH-MCNC: 0.9 G/DL (ref 0.7–1.3)
GAMMA GLOB SERPL ELPH-MCNC: 2.4 G/DL (ref 0.4–1.8)
GLOBULIN SER CALC-MCNC: 4 G/DL (ref 2.2–3.9)
IGA SERPL-MCNC: 32 MG/DL (ref 61–437)
IGG SERPL-MCNC: 2581 MG/DL (ref 700–1600)
IGM SERPL-MCNC: 35 MG/DL (ref 20–172)
INTERPRETATION SERPL IEP-IMP: ABNORMAL
KAPPA LC SERPL-MCNC: 9.6 MG/L (ref 3.3–19.4)
KAPPA LC/LAMBDA SER: 0.07 {RATIO} (ref 0.26–1.65)
LAMBDA LC FREE SERPL-MCNC: 133 MG/L (ref 5.7–26.3)
Lab: ABNORMAL
M-SPIKE: 2.1 G/DL
PROT SERPL-MCNC: 8.4 G/DL (ref 6–8.5)

## 2018-03-16 ENCOUNTER — TELEPHONE (OUTPATIENT)
Dept: ONCOLOGY | Facility: CLINIC | Age: 68
End: 2018-03-16

## 2018-03-16 NOTE — TELEPHONE ENCOUNTER
Spoke with patient. Let him know his protein level is stable and we will check it in 6 months.  Apt scheduled 9/25/18.

## 2018-09-18 ENCOUNTER — LAB (OUTPATIENT)
Dept: LAB | Facility: HOSPITAL | Age: 68
End: 2018-09-18

## 2018-09-18 DIAGNOSIS — D47.2 IGG MONOCLONAL PROTEIN DISORDER: ICD-10-CM

## 2018-09-18 LAB
ALBUMIN SERPL-MCNC: 4.51 G/DL (ref 3.2–4.8)
ALBUMIN/GLOB SERPL: 1.2 G/DL (ref 1.5–2.5)
ALP SERPL-CCNC: 57 U/L (ref 25–100)
ALT SERPL W P-5'-P-CCNC: 19 U/L (ref 7–40)
ANION GAP SERPL CALCULATED.3IONS-SCNC: 7 MMOL/L (ref 3–11)
AST SERPL-CCNC: 17 U/L (ref 0–33)
BILIRUB SERPL-MCNC: 0.6 MG/DL (ref 0.3–1.2)
BUN BLD-MCNC: 11 MG/DL (ref 9–23)
BUN/CREAT SERPL: 12.2 (ref 7–25)
CALCIUM SPEC-SCNC: 9.2 MG/DL (ref 8.7–10.4)
CHLORIDE SERPL-SCNC: 108 MMOL/L (ref 99–109)
CO2 SERPL-SCNC: 27 MMOL/L (ref 20–31)
CREAT BLD-MCNC: 0.9 MG/DL (ref 0.6–1.3)
ERYTHROCYTE [DISTWIDTH] IN BLOOD BY AUTOMATED COUNT: 14.6 % (ref 11.3–14.5)
GFR SERPL CREATININE-BSD FRML MDRD: 102 ML/MIN/1.73
GLOBULIN UR ELPH-MCNC: 3.7 GM/DL
GLUCOSE BLD-MCNC: 92 MG/DL (ref 70–100)
HCT VFR BLD AUTO: 40.1 % (ref 38.9–50.9)
HGB BLD-MCNC: 13.3 G/DL (ref 13.1–17.5)
LYMPHOCYTES # BLD AUTO: 1.4 10*3/MM3 (ref 0.6–4.8)
LYMPHOCYTES NFR BLD AUTO: 56.8 % (ref 24–44)
MCH RBC QN AUTO: 31.1 PG (ref 27–31)
MCHC RBC AUTO-ENTMCNC: 33.2 G/DL (ref 32–36)
MCV RBC AUTO: 93.9 FL (ref 80–99)
MONOCYTES # BLD AUTO: 0.1 10*3/MM3 (ref 0–1)
MONOCYTES NFR BLD AUTO: 5 % (ref 0–12)
NEUTROPHILS # BLD AUTO: 1 10*3/MM3 (ref 1.5–8.3)
NEUTROPHILS NFR BLD AUTO: 38.2 % (ref 41–71)
PLATELET # BLD AUTO: 279 10*3/MM3 (ref 150–450)
PMV BLD AUTO: 7.5 FL (ref 6–12)
POTASSIUM BLD-SCNC: 4.2 MMOL/L (ref 3.5–5.5)
PROT SERPL-MCNC: 8.2 G/DL (ref 5.7–8.2)
RBC # BLD AUTO: 4.27 10*6/MM3 (ref 4.2–5.76)
SODIUM BLD-SCNC: 142 MMOL/L (ref 132–146)
WBC NRBC COR # BLD: 2.5 10*3/MM3 (ref 3.5–10.8)

## 2018-09-18 PROCEDURE — 82784 ASSAY IGA/IGD/IGG/IGM EACH: CPT

## 2018-09-18 PROCEDURE — 80053 COMPREHEN METABOLIC PANEL: CPT

## 2018-09-18 PROCEDURE — 84165 PROTEIN E-PHORESIS SERUM: CPT

## 2018-09-18 PROCEDURE — 85025 COMPLETE CBC W/AUTO DIFF WBC: CPT

## 2018-09-18 PROCEDURE — 86334 IMMUNOFIX E-PHORESIS SERUM: CPT

## 2018-09-18 PROCEDURE — 36415 COLL VENOUS BLD VENIPUNCTURE: CPT

## 2018-09-19 LAB
ALBUMIN SERPL-MCNC: 4.4 G/DL (ref 2.9–4.4)
ALBUMIN/GLOB SERPL: 1 {RATIO} (ref 0.7–1.7)
ALPHA1 GLOB FLD ELPH-MCNC: 0.3 G/DL (ref 0–0.4)
ALPHA2 GLOB SERPL ELPH-MCNC: 0.6 G/DL (ref 0.4–1)
B-GLOBULIN SERPL ELPH-MCNC: 1.1 G/DL (ref 0.7–1.3)
GAMMA GLOB SERPL ELPH-MCNC: 2.5 G/DL (ref 0.4–1.8)
GLOBULIN SER CALC-MCNC: 4.5 G/DL (ref 2.2–3.9)
IGA SERPL-MCNC: 33 MG/DL (ref 61–437)
IGG SERPL-MCNC: 2960 MG/DL (ref 700–1600)
IGM SERPL-MCNC: 30 MG/DL (ref 20–172)
INTERPRETATION SERPL IEP-IMP: ABNORMAL
Lab: ABNORMAL
M-SPIKE: 2.2 G/DL
PROT SERPL-MCNC: 8.9 G/DL (ref 6–8.5)

## 2018-10-23 ENCOUNTER — OFFICE VISIT (OUTPATIENT)
Dept: ONCOLOGY | Facility: CLINIC | Age: 68
End: 2018-10-23

## 2018-10-23 ENCOUNTER — APPOINTMENT (OUTPATIENT)
Dept: LAB | Facility: HOSPITAL | Age: 68
End: 2018-10-23

## 2018-10-23 VITALS
OXYGEN SATURATION: 100 % | WEIGHT: 167 LBS | TEMPERATURE: 97.6 F | HEIGHT: 72 IN | HEART RATE: 50 BPM | BODY MASS INDEX: 22.62 KG/M2 | RESPIRATION RATE: 18 BRPM | SYSTOLIC BLOOD PRESSURE: 135 MMHG | DIASTOLIC BLOOD PRESSURE: 67 MMHG

## 2018-10-23 DIAGNOSIS — D47.2 IGG MONOCLONAL PROTEIN DISORDER: Primary | ICD-10-CM

## 2018-10-23 PROCEDURE — 99213 OFFICE O/P EST LOW 20 MIN: CPT | Performed by: NURSE PRACTITIONER

## 2018-10-23 NOTE — PROGRESS NOTES
"      PROBLEM LIST:  1. Monoclonal protein diagnosed in 2005 previously followed by Dr. Usama Jones, IgG:   a) Bone marrow biopsy on 03/10/2005 showed a normocellular bone marrow with  normal hematopoiesis with no evidence of multiple myeloma or lymphoma, plasma  cells 3%.   b) Reevaluation on 09/16/2014 showed a normal CBC, normal calcium, normal  kidney function, IgG lambda monoclonal protein of 1.6 g/dL, kappa lambda ratio  of 0.15, spot urine immunofixation with IgG lambda.   c) Repeat bone marrow biopsy on 03/01/2016 showed a normocellular marrow with  3 to 5% plasma cells. The plasma cells had a mild relative lambda excess.  There was no evidence of amyloid deposition on Congo red stain.  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  2. Chronic pain.   3. Hypertension.    Subjective     HISTORY OF PRESENT ILLNESS:   Jh Bryan returns for follow-up.    He reports he's feeling about the same.  He still has fatigue.  His anxiety and depression is a little better since starting on BuSpar.  He has some burning in his stomach that he is being evaluated for.  He also had his gallbladder evaluated.  He denies any new bone pain.     Past Medical History, Past Surgical History, Social History, Family History have been reviewed and are without significant changes except as mentioned.    Review of Systems   A comprehensive 14 point review of systems was performed and was negative except as mentioned.    Medications:  The current medication list was reviewed in the EMR    ALLERGIES:  No Known Allergies    Objective      /67 Comment: LUE  Pulse 50   Temp 97.6 °F (36.4 °C) (Temporal Artery )   Resp 18   Ht 182.9 cm (72\")   Wt 75.8 kg (167 lb)   SpO2 100% Comment: RA  BMI 22.65 kg/m²      Performance Status: 1    General: well appearing, in no acute distress  HEENT: sclera anicteric, oropharynx clear  Lymphatics: no cervical, supraclavicular, or axillary adenopathy  Cardiovascular: regular rate and rhythm, no " murmurs  Lungs: clear to auscultation bilaterally  Abdomen: Mild left upper quadrant tenderness.  No palpable mass.  No palpable splenomegaly.  Extremeties: no lower extremity edema  Skin: no rashes, lesions, bruising, or petechiae  Psych: mood and affect appropriate    Labs:  M spike 9/18/18 is 2.2 gm.  Labs otherwise stable.      Assessment/Plan   Jh Bryan is a 68 y.o. year old male with IgG monoclonal gammopathy who returns for 6 month follow up.  M-spike remains elevated but clinically  He is otherwise stable.   As long as his monoclonal protein remains relatively stable and there continues to be no other lab abnormalities we will continue to check every 6 months. We will repeat his labs and serum protein electrophoresis in 6  months.              Visit time was 15 minutes, greater than 50% spent in counseling      Amy Noel APRN  10/23/2018          CC:

## 2019-05-02 ENCOUNTER — LAB (OUTPATIENT)
Dept: LAB | Facility: HOSPITAL | Age: 69
End: 2019-05-02

## 2019-05-02 DIAGNOSIS — D47.2 IGG MONOCLONAL PROTEIN DISORDER: ICD-10-CM

## 2019-05-02 LAB
ALBUMIN SERPL-MCNC: 4.6 G/DL (ref 3.5–5.2)
ALBUMIN/GLOB SERPL: 1.1 G/DL
ALP SERPL-CCNC: 67 U/L (ref 39–117)
ALT SERPL W P-5'-P-CCNC: 18 U/L (ref 1–41)
ANION GAP SERPL CALCULATED.3IONS-SCNC: 13 MMOL/L
AST SERPL-CCNC: 16 U/L (ref 1–40)
BILIRUB SERPL-MCNC: 0.4 MG/DL (ref 0.2–1.2)
BUN BLD-MCNC: 12 MG/DL (ref 8–23)
BUN/CREAT SERPL: 13.3 (ref 7–25)
CALCIUM SPEC-SCNC: 9.3 MG/DL (ref 8.6–10.5)
CHLORIDE SERPL-SCNC: 105 MMOL/L (ref 98–107)
CO2 SERPL-SCNC: 24 MMOL/L (ref 22–29)
CREAT BLD-MCNC: 0.9 MG/DL (ref 0.76–1.27)
ERYTHROCYTE [DISTWIDTH] IN BLOOD BY AUTOMATED COUNT: 15.2 % (ref 12.3–15.4)
GFR SERPL CREATININE-BSD FRML MDRD: 102 ML/MIN/1.73
GLOBULIN UR ELPH-MCNC: 4.3 GM/DL
GLUCOSE BLD-MCNC: 96 MG/DL (ref 65–99)
HCT VFR BLD AUTO: 41.4 % (ref 37.5–51)
HGB BLD-MCNC: 13.8 G/DL (ref 13–17.7)
LYMPHOCYTES # BLD AUTO: 1.7 10*3/MM3 (ref 0.7–3.1)
LYMPHOCYTES NFR BLD AUTO: 57.9 % (ref 19.6–45.3)
MCH RBC QN AUTO: 30.7 PG (ref 26.6–33)
MCHC RBC AUTO-ENTMCNC: 33.3 G/DL (ref 31.5–35.7)
MCV RBC AUTO: 92.1 FL (ref 79–97)
MONOCYTES # BLD AUTO: 0.1 10*3/MM3 (ref 0.1–0.9)
MONOCYTES NFR BLD AUTO: 4.1 % (ref 5–12)
NEUTROPHILS # BLD AUTO: 1.1 10*3/MM3 (ref 1.7–7)
NEUTROPHILS NFR BLD AUTO: 38 % (ref 42.7–76)
PLATELET # BLD AUTO: 295 10*3/MM3 (ref 140–450)
PMV BLD AUTO: 7.8 FL (ref 6–12)
POTASSIUM BLD-SCNC: 4.2 MMOL/L (ref 3.5–5.2)
PROT SERPL-MCNC: 8.9 G/DL (ref 6–8.5)
RBC # BLD AUTO: 4.5 10*6/MM3 (ref 4.14–5.8)
SODIUM BLD-SCNC: 142 MMOL/L (ref 136–145)
WBC NRBC COR # BLD: 2.9 10*3/MM3 (ref 3.4–10.8)

## 2019-05-02 PROCEDURE — 82784 ASSAY IGA/IGD/IGG/IGM EACH: CPT

## 2019-05-02 PROCEDURE — 85025 COMPLETE CBC W/AUTO DIFF WBC: CPT

## 2019-05-02 PROCEDURE — 83883 ASSAY NEPHELOMETRY NOT SPEC: CPT

## 2019-05-02 PROCEDURE — 80053 COMPREHEN METABOLIC PANEL: CPT

## 2019-05-02 PROCEDURE — 36415 COLL VENOUS BLD VENIPUNCTURE: CPT

## 2019-05-02 PROCEDURE — 84165 PROTEIN E-PHORESIS SERUM: CPT

## 2019-05-02 PROCEDURE — 86334 IMMUNOFIX E-PHORESIS SERUM: CPT

## 2019-05-03 LAB
ALBUMIN SERPL-MCNC: 4.1 G/DL (ref 2.9–4.4)
ALBUMIN/GLOB SERPL: 1 {RATIO} (ref 0.7–1.7)
ALPHA1 GLOB FLD ELPH-MCNC: 0.3 G/DL (ref 0–0.4)
ALPHA2 GLOB SERPL ELPH-MCNC: 0.6 G/DL (ref 0.4–1)
B-GLOBULIN SERPL ELPH-MCNC: 1 G/DL (ref 0.7–1.3)
GAMMA GLOB SERPL ELPH-MCNC: 2.4 G/DL (ref 0.4–1.8)
GLOBULIN SER CALC-MCNC: 4.4 G/DL (ref 2.2–3.9)
IGA SERPL-MCNC: 31 MG/DL (ref 61–437)
IGG SERPL-MCNC: 2794 MG/DL (ref 700–1600)
IGM SERPL-MCNC: 32 MG/DL (ref 20–172)
INTERPRETATION SERPL IEP-IMP: ABNORMAL
KAPPA LC SERPL-MCNC: 8.9 MG/L (ref 3.3–19.4)
KAPPA LC/LAMBDA SER: 0.06 {RATIO} (ref 0.26–1.65)
LAMBDA LC FREE SERPL-MCNC: 139.7 MG/L (ref 5.7–26.3)
Lab: ABNORMAL
M-SPIKE: 2.2 G/DL
PROT SERPL-MCNC: 8.5 G/DL (ref 6–8.5)

## 2019-05-10 ENCOUNTER — OFFICE VISIT (OUTPATIENT)
Dept: ONCOLOGY | Facility: CLINIC | Age: 69
End: 2019-05-10

## 2019-05-10 VITALS
BODY MASS INDEX: 22.48 KG/M2 | DIASTOLIC BLOOD PRESSURE: 84 MMHG | HEART RATE: 78 BPM | SYSTOLIC BLOOD PRESSURE: 134 MMHG | WEIGHT: 166 LBS | TEMPERATURE: 98 F | HEIGHT: 72 IN | RESPIRATION RATE: 16 BRPM

## 2019-05-10 DIAGNOSIS — D47.2 IGG MONOCLONAL PROTEIN DISORDER: Primary | ICD-10-CM

## 2019-05-10 PROCEDURE — 99213 OFFICE O/P EST LOW 20 MIN: CPT | Performed by: INTERNAL MEDICINE

## 2019-05-10 RX ORDER — DULOXETIN HYDROCHLORIDE 30 MG/1
30 CAPSULE, DELAYED RELEASE ORAL DAILY
Refills: 0 | COMMUNITY
Start: 2019-02-28 | End: 2020-03-27

## 2019-05-10 NOTE — PROGRESS NOTES
"      PROBLEM LIST:  1. Monoclonal protein diagnosed in 2005 previously followed by Dr. Usama Jones, IgG:   a) Bone marrow biopsy on 03/10/2005 showed a normocellular bone marrow with  normal hematopoiesis with no evidence of multiple myeloma or lymphoma, plasma  cells 3%.   b) Reevaluation on 09/16/2014 showed a normal CBC, normal calcium, normal  kidney function, IgG lambda monoclonal protein of 1.6 g/dL, kappa lambda ratio  of 0.15, spot urine immunofixation with IgG lambda.   c) Repeat bone marrow biopsy on 03/01/2016 showed a normocellular marrow with  3 to 5% plasma cells. The plasma cells had a mild relative lambda excess.  There was no evidence of amyloid deposition on Congo red stain.  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  2. Chronic pain.   3. Hypertension.    Subjective     HISTORY OF PRESENT ILLNESS:   Jh Bryan returns for follow-up.   He says he feels tired.  This seems to get worse as time goes on.  He also complains of occasional headaches.  He has a lot of pain in his legs and shoulders and back.  He specifically notes pain in his left heel that hurts when he steps on it.    Past Medical History, Past Surgical History, Social History, Family History have been reviewed and are without significant changes except as mentioned.    Review of Systems   A comprehensive 14 point review of systems was performed and was negative except as mentioned.    Medications:  The current medication list was reviewed in the EMR    ALLERGIES:  No Known Allergies    Objective      /84   Pulse 78   Temp 98 °F (36.7 °C) (Temporal)   Resp 16   Ht 182.9 cm (72\")   Wt 75.3 kg (166 lb)   BMI 22.51 kg/m²      Performance Status: 1    General: well appearing, in no acute distress  HEENT: sclera anicteric, oropharynx clear  Lymphatics: no cervical, supraclavicular, or axillary adenopathy  Cardiovascular: regular rate and rhythm, no murmurs  Lungs: clear to auscultation bilaterally  Abdomen: Mild left upper " quadrant tenderness.  No palpable mass.  No palpable splenomegaly.  Extremeties: no lower extremity edema  Skin: no rashes, lesions, bruising, or petechiae  Psych: mood and affect appropriate    Labs:  Results for MATT JEWELL (MRN [id number]) as of 5/10/2019 13:06   Ref. Range 5/2/2019 09:17 5/2/2019 09:17   Globulin Latest Ref Range: 2.2 - 3.9 g/dL  4.4 (H)   Alpha-1-Globulin Latest Ref Range: 0.0 - 0.4 g/dL  0.3   Alpha-2-Globulin Latest Ref Range: 0.4 - 1.0 g/dL  0.6   Beta Globulin Latest Ref Range: 0.7 - 1.3 g/dL  1.0   Gamma Globulin Latest Ref Range: 0.4 - 1.8 g/dL  2.4 (H)   M-Dylan Latest Ref Range: Not Observed g/dL  2.2 (H)   Immunofixation Reflex, Serum Unknown  Comment   WBC Latest Ref Range: 3.40 - 10.80 10*3/mm3 2.90 (L)    RBC Latest Ref Range: 4.14 - 5.80 10*6/mm3 4.50    Hemoglobin Latest Ref Range: 13.0 - 17.7 g/dL 13.8    Hematocrit Latest Ref Range: 37.5 - 51.0 % 41.4    RDW Latest Ref Range: 12.3 - 15.4 % 15.2    MCV Latest Ref Range: 79.0 - 97.0 fL 92.1    MCH Latest Ref Range: 26.6 - 33.0 pg 30.7    MCHC Latest Ref Range: 31.5 - 35.7 g/dL 33.3    MPV Latest Ref Range: 6.0 - 12.0 fL 7.8    Platelets Latest Ref Range: 140 - 450 10*3/mm3 295    Neutrophil Rel % Latest Ref Range: 42.7 - 76.0 % 38.0 (L)    Lymphocyte Rel % Latest Ref Range: 19.6 - 45.3 % 57.9 (H)    Monocyte Rel % Latest Ref Range: 5.0 - 12.0 % 4.1 (L)    Neutrophils Absolute Latest Ref Range: 1.70 - 7.00 10*3/mm3 1.10 (L)    Lymphocytes Absolute Latest Ref Range: 0.70 - 3.10 10*3/mm3 1.70    Monocytes Absolute Latest Ref Range: 0.10 - 0.90 10*3/mm3 0.10    IgA Latest Ref Range: 61 - 437 mg/dL  31 (L)   IgG Latest Ref Range: 700 - 1600 mg/dL  2794 (H)   IgM Latest Ref Range: 20 - 172 mg/dL  32   Kappa FLC Latest Ref Range: 3.3 - 19.4 mg/L 8.9    Lambda FLC Latest Ref Range: 5.7 - 26.3 mg/L 139.7 (H)    Kappa/Lambda Ratio Latest Ref Range: 0.26 - 1.65  0.06 (L)    Please Note Unknown  Comment         Assessment/Plan   Matt RAE  Irvin is a 68 y.o. year old male with IgG monoclonal gammopathy who returns for 6 month follow up.  His monoclonal protein remains stable.  There is no evidence of progression of his disease to myeloma.  However since he is having some increased generalized pain we will repeat a skeletal survey to make sure there are no lytic lesions.  In terms of his left heel pain this sounds consistent with plantar fasciitis.  We discussed at home interventions for this including shoe inserts, ice, and stretches.  We will see him back in 6 months with repeat labs.          I spent 15 minutes with the patient. I spent > 50% percent of this time counseling and discussing prognosis, diagnostic testing, evaluation, current status and management.    hCeri Wong MD    5/10/2019          CC:

## 2019-05-16 ENCOUNTER — HOSPITAL ENCOUNTER (OUTPATIENT)
Dept: GENERAL RADIOLOGY | Facility: HOSPITAL | Age: 69
Discharge: HOME | End: 2019-05-16
Admitting: INTERNAL MEDICINE

## 2019-05-16 DIAGNOSIS — D47.2 IGG MONOCLONAL PROTEIN DISORDER: ICD-10-CM

## 2019-05-16 PROCEDURE — 77075 RADEX OSSEOUS SURVEY COMPL: CPT

## 2019-05-21 ENCOUNTER — TELEPHONE (OUTPATIENT)
Dept: ONCOLOGY | Facility: CLINIC | Age: 69
End: 2019-05-21

## 2019-05-21 NOTE — TELEPHONE ENCOUNTER
----- Message from Cheri Wong MD sent at 5/17/2019  6:14 PM EDT -----  Regarding: xray results  Please let him know bone survey is all normal.  No evidence of lesions in the bones.    Thx.    ----- Message -----  From: Interface, Rad Results [place] In  Sent: 5/16/2019   4:41 PM  To: Cheri Wong MD

## 2019-11-08 ENCOUNTER — LAB (OUTPATIENT)
Dept: LAB | Facility: HOSPITAL | Age: 69
End: 2019-11-08

## 2019-11-08 DIAGNOSIS — D47.2 IGG MONOCLONAL PROTEIN DISORDER: ICD-10-CM

## 2019-11-08 LAB
ALBUMIN SERPL-MCNC: 4.3 G/DL (ref 3.5–5.2)
ALBUMIN/GLOB SERPL: 1 G/DL
ALP SERPL-CCNC: 58 U/L (ref 39–117)
ALT SERPL W P-5'-P-CCNC: 14 U/L (ref 1–41)
ANION GAP SERPL CALCULATED.3IONS-SCNC: 10 MMOL/L (ref 5–15)
AST SERPL-CCNC: 14 U/L (ref 1–40)
BILIRUB SERPL-MCNC: 0.4 MG/DL (ref 0.2–1.2)
BUN BLD-MCNC: 11 MG/DL (ref 8–23)
BUN/CREAT SERPL: 13.4 (ref 7–25)
CALCIUM SPEC-SCNC: 9.3 MG/DL (ref 8.6–10.5)
CHLORIDE SERPL-SCNC: 107 MMOL/L (ref 98–107)
CO2 SERPL-SCNC: 25 MMOL/L (ref 22–29)
CREAT BLD-MCNC: 0.82 MG/DL (ref 0.76–1.27)
ERYTHROCYTE [DISTWIDTH] IN BLOOD BY AUTOMATED COUNT: 15 % (ref 12.3–15.4)
GFR SERPL CREATININE-BSD FRML MDRD: 113 ML/MIN/1.73
GLOBULIN UR ELPH-MCNC: 4.3 GM/DL
GLUCOSE BLD-MCNC: 86 MG/DL (ref 65–99)
HCT VFR BLD AUTO: 39.7 % (ref 37.5–51)
HGB BLD-MCNC: 13.2 G/DL (ref 13–17.7)
LYMPHOCYTES # BLD AUTO: 1.4 10*3/MM3 (ref 0.7–3.1)
LYMPHOCYTES NFR BLD AUTO: 57.9 % (ref 19.6–45.3)
MCH RBC QN AUTO: 31.5 PG (ref 26.6–33)
MCHC RBC AUTO-ENTMCNC: 33.3 G/DL (ref 31.5–35.7)
MCV RBC AUTO: 94.6 FL (ref 79–97)
MONOCYTES # BLD AUTO: 0.1 10*3/MM3 (ref 0.1–0.9)
MONOCYTES NFR BLD AUTO: 5.9 % (ref 5–12)
NEUTROPHILS # BLD AUTO: 0.9 10*3/MM3 (ref 1.7–7)
NEUTROPHILS NFR BLD AUTO: 36.2 % (ref 42.7–76)
PLATELET # BLD AUTO: 280 10*3/MM3 (ref 140–450)
PMV BLD AUTO: 6.7 FL (ref 6–12)
POTASSIUM BLD-SCNC: 4 MMOL/L (ref 3.5–5.2)
PROT SERPL-MCNC: 8.6 G/DL (ref 6–8.5)
RBC # BLD AUTO: 4.2 10*6/MM3 (ref 4.14–5.8)
SODIUM BLD-SCNC: 142 MMOL/L (ref 136–145)
WBC NRBC COR # BLD: 2.4 10*3/MM3 (ref 3.4–10.8)

## 2019-11-08 PROCEDURE — 82784 ASSAY IGA/IGD/IGG/IGM EACH: CPT

## 2019-11-08 PROCEDURE — 36415 COLL VENOUS BLD VENIPUNCTURE: CPT

## 2019-11-08 PROCEDURE — 80053 COMPREHEN METABOLIC PANEL: CPT

## 2019-11-08 PROCEDURE — 84165 PROTEIN E-PHORESIS SERUM: CPT

## 2019-11-08 PROCEDURE — 86334 IMMUNOFIX E-PHORESIS SERUM: CPT

## 2019-11-08 PROCEDURE — 83883 ASSAY NEPHELOMETRY NOT SPEC: CPT

## 2019-11-08 PROCEDURE — 85025 COMPLETE CBC W/AUTO DIFF WBC: CPT

## 2019-11-09 LAB
KAPPA LC SERPL-MCNC: 8.9 MG/L (ref 3.3–19.4)
KAPPA LC/LAMBDA SER: 0.07 {RATIO} (ref 0.26–1.65)
LAMBDA LC FREE SERPL-MCNC: 131.5 MG/L (ref 5.7–26.3)

## 2019-11-11 LAB
ALBUMIN SERPL-MCNC: 4.1 G/DL (ref 2.9–4.4)
ALBUMIN/GLOB SERPL: 1.1 {RATIO} (ref 0.7–1.7)
ALPHA1 GLOB FLD ELPH-MCNC: 0.2 G/DL (ref 0–0.4)
ALPHA2 GLOB SERPL ELPH-MCNC: 0.5 G/DL (ref 0.4–1)
B-GLOBULIN SERPL ELPH-MCNC: 0.8 G/DL (ref 0.7–1.3)
GAMMA GLOB SERPL ELPH-MCNC: 2.3 G/DL (ref 0.4–1.8)
GLOBULIN SER CALC-MCNC: 3.9 G/DL (ref 2.2–3.9)
IGA SERPL-MCNC: 32 MG/DL (ref 61–437)
IGG SERPL-MCNC: 2916 MG/DL (ref 700–1600)
IGM SERPL-MCNC: 29 MG/DL (ref 20–172)
INTERPRETATION SERPL IEP-IMP: ABNORMAL
Lab: ABNORMAL
M-SPIKE: 2 G/DL
PROT SERPL-MCNC: 8 G/DL (ref 6–8.5)

## 2019-11-14 ENCOUNTER — OFFICE VISIT (OUTPATIENT)
Dept: ONCOLOGY | Facility: CLINIC | Age: 69
End: 2019-11-14

## 2019-11-14 VITALS
OXYGEN SATURATION: 100 % | SYSTOLIC BLOOD PRESSURE: 150 MMHG | TEMPERATURE: 98.6 F | BODY MASS INDEX: 20.99 KG/M2 | RESPIRATION RATE: 16 BRPM | HEIGHT: 72 IN | WEIGHT: 155 LBS | HEART RATE: 55 BPM | DIASTOLIC BLOOD PRESSURE: 71 MMHG

## 2019-11-14 DIAGNOSIS — D47.2 IGG MONOCLONAL PROTEIN DISORDER: Primary | ICD-10-CM

## 2019-11-14 PROCEDURE — 99213 OFFICE O/P EST LOW 20 MIN: CPT | Performed by: INTERNAL MEDICINE

## 2019-11-14 NOTE — PROGRESS NOTES
"      PROBLEM LIST:  1. Monoclonal protein diagnosed in 2005 previously followed by Dr. Usama Jones, IgG:   a) Bone marrow biopsy on 03/10/2005 showed a normocellular bone marrow with normal hematopoiesis with no evidence of multiple myeloma or lymphoma, plasma cells 3%.   b) Reevaluation on 09/16/2014 showed a normal CBC, normal calcium, normal kidney function, IgG lambda monoclonal protein of 1.6 g/dL, kappa lambda ratio of 0.15, spot urine immunofixation with IgG lambda.   c) Repeat bone marrow biopsy on 03/01/2016 showed a normocellular marrow with 3 to 5% plasma cells. The plasma cells had a mild relative lambda excess. There was no evidence of amyloid deposition on Congo red stain.  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  2. Chronic pain.   3. Hypertension.    Subjective      CC: MGUS    HISTORY OF PRESENT ILLNESS:   Jh Bryan returns for follow-up.   He says he has been having some issues with urinary symptoms.  He was treated for prostatitis, but the symptoms recurred after finishing antibiotics.  He sees Dr. Stout again in 2 more weeks.  Otherwise he reports some pain when he lays on his left side.  He has had this for about 2 months.    Past Medical History, Past Surgical History, Social History, Family History have been reviewed and are without significant changes except as mentioned.    Review of Systems   A comprehensive 14 point review of systems was performed and was negative except as mentioned.    Medications:  The current medication list was reviewed in the EMR    ALLERGIES:  No Known Allergies    Objective      /71 Comment: LUE  Pulse 55   Temp 98.6 °F (37 °C) (Temporal)   Resp 16   Ht 182.9 cm (72\")   Wt 70.3 kg (155 lb)   SpO2 100% Comment: RA  BMI 21.02 kg/m²      Performance Status: 1    General: well appearing, in no acute distress  HEENT: sclera anicteric, oropharynx clear  Lymphatics: no cervical, supraclavicular, or axillary adenopathy  Cardiovascular: regular rate and " rhythm, no murmurs  Lungs: clear to auscultation bilaterally  Abdomen: Mild left upper quadrant tenderness.  No palpable mass.  No palpable splenomegaly.  Extremeties: no lower extremity edema  Skin: no rashes, lesions, bruising, or petechiae  Psych: mood and affect appropriate    Labs:  Results for MATT JEWELL (MRN [id number]) as of 11/14/2019 08:50   Ref. Range 11/8/2019 08:56 11/8/2019 08:56   Glucose Latest Ref Range: 65 - 99 mg/dL 86    Sodium Latest Ref Range: 136 - 145 mmol/L 142    Potassium Latest Ref Range: 3.5 - 5.2 mmol/L 4.0    CO2 Latest Ref Range: 22.0 - 29.0 mmol/L 25.0    Chloride Latest Ref Range: 98 - 107 mmol/L 107    Anion Gap Latest Ref Range: 5.0 - 15.0 mmol/L 10.0    Creatinine Latest Ref Range: 0.76 - 1.27 mg/dL 0.82    BUN Latest Ref Range: 8 - 23 mg/dL 11    BUN/Creatinine Ratio Latest Ref Range: 7.0 - 25.0  13.4    Calcium Latest Ref Range: 8.6 - 10.5 mg/dL 9.3    eGFR African Am Latest Ref Range: >60 mL/min/1.73 113    Alkaline Phosphatase Latest Ref Range: 39 - 117 U/L 58    Total Protein Latest Ref Range: 6.0 - 8.5 g/dL 8.6 (H) 8.0   ALT (SGPT) Latest Ref Range: 1 - 41 U/L 14    AST (SGOT) Latest Ref Range: 1 - 40 U/L 14    Total Bilirubin Latest Ref Range: 0.2 - 1.2 mg/dL 0.4    Albumin Latest Ref Range: 2.9 - 4.4 g/dL 4.30 4.1   Globulin Latest Units: gm/dL 4.3    A/G Ratio Latest Ref Range: 0.7 - 1.7  1.0 1.1   Globulin Latest Ref Range: 2.2 - 3.9 g/dL  3.9   Alpha-1-Globulin Latest Ref Range: 0.0 - 0.4 g/dL  0.2   Alpha-2-Globulin Latest Ref Range: 0.4 - 1.0 g/dL  0.5   Beta Globulin Latest Ref Range: 0.7 - 1.3 g/dL  0.8   Gamma Globulin Latest Ref Range: 0.4 - 1.8 g/dL  2.3 (H)   M-Dylan Latest Ref Range: Not Observed g/dL  2.0 (H)   Immunofixation Reflex, Serum Unknown  Comment   WBC Latest Ref Range: 3.40 - 10.80 10*3/mm3 2.40 (L)    RBC Latest Ref Range: 4.14 - 5.80 10*6/mm3 4.20    Hemoglobin Latest Ref Range: 13.0 - 17.7 g/dL 13.2    Hematocrit Latest Ref Range: 37.5 -  51.0 % 39.7    RDW Latest Ref Range: 12.3 - 15.4 % 15.0    MCV Latest Ref Range: 79.0 - 97.0 fL 94.6    MCH Latest Ref Range: 26.6 - 33.0 pg 31.5    MCHC Latest Ref Range: 31.5 - 35.7 g/dL 33.3    MPV Latest Ref Range: 6.0 - 12.0 fL 6.7    Platelets Latest Ref Range: 140 - 450 10*3/mm3 280    Neutrophil Rel % Latest Ref Range: 42.7 - 76.0 % 36.2 (L)    Lymphocyte Rel % Latest Ref Range: 19.6 - 45.3 % 57.9 (H)    Monocyte Rel % Latest Ref Range: 5.0 - 12.0 % 5.9    Neutrophils Absolute Latest Ref Range: 1.70 - 7.00 10*3/mm3 0.90 (L)    Lymphocytes Absolute Latest Ref Range: 0.70 - 3.10 10*3/mm3 1.40    Monocytes Absolute Latest Ref Range: 0.10 - 0.90 10*3/mm3 0.10    IgA Latest Ref Range: 61 - 437 mg/dL  32 (L)   IgG Latest Ref Range: 700 - 1600 mg/dL  2916 (H)   IgM Latest Ref Range: 20 - 172 mg/dL  29   Kappa FLC Latest Ref Range: 3.3 - 19.4 mg/L 8.9    Free Lambda Light Chains Latest Ref Range: 5.7 - 26.3 mg/L 131.5 (H)    Kappa/Lambda Ratio Latest Ref Range: 0.26 - 1.65  0.07 (L)        Assessment/Plan   Jh Bryan is a 69 y.o. year old male with IgG monoclonal gammopathy who returns for 6 month follow up.  His monoclonal protein remains stable.  There is no evidence of progression of his disease to myeloma.  I will do a skeletal survey just to evaluate his hip pain, but I think this is unlikely to be related to his monoclonal protein.      Assuming the skeletal survey is negative, we will space out follow up to once yearly.    I spent 15 minutes with the patient. I spent > 50% percent of this time counseling and discussing prognosis, diagnostic testing, evaluation, current status and management.    Cheri Wong MD    11/14/2019          CC:

## 2019-11-15 ENCOUNTER — HOSPITAL ENCOUNTER (OUTPATIENT)
Dept: GENERAL RADIOLOGY | Facility: HOSPITAL | Age: 69
Discharge: HOME | End: 2019-11-15
Admitting: INTERNAL MEDICINE

## 2019-11-15 DIAGNOSIS — D47.2 IGG MONOCLONAL PROTEIN DISORDER: ICD-10-CM

## 2019-11-15 PROCEDURE — 77075 RADEX OSSEOUS SURVEY COMPL: CPT

## 2019-11-20 ENCOUNTER — TELEPHONE (OUTPATIENT)
Dept: ONCOLOGY | Facility: CLINIC | Age: 69
End: 2019-11-20

## 2020-03-27 ENCOUNTER — HOSPITAL ENCOUNTER (EMERGENCY)
Facility: HOSPITAL | Age: 70
Discharge: HOME | End: 2020-03-27
Attending: EMERGENCY MEDICINE | Admitting: EMERGENCY MEDICINE

## 2020-03-27 ENCOUNTER — TELEPHONE (OUTPATIENT)
Dept: ONCOLOGY | Facility: CLINIC | Age: 70
End: 2020-03-27

## 2020-03-27 ENCOUNTER — APPOINTMENT (OUTPATIENT)
Dept: CT IMAGING | Facility: HOSPITAL | Age: 70
End: 2020-03-27

## 2020-03-27 VITALS
OXYGEN SATURATION: 100 % | BODY MASS INDEX: 20.86 KG/M2 | SYSTOLIC BLOOD PRESSURE: 168 MMHG | RESPIRATION RATE: 16 BRPM | HEART RATE: 50 BPM | TEMPERATURE: 97.6 F | HEIGHT: 72 IN | WEIGHT: 154 LBS | DIASTOLIC BLOOD PRESSURE: 79 MMHG

## 2020-03-27 DIAGNOSIS — E83.52 HYPERCALCEMIA: ICD-10-CM

## 2020-03-27 DIAGNOSIS — R10.9 ACUTE ABDOMINAL PAIN: Primary | ICD-10-CM

## 2020-03-27 DIAGNOSIS — I10 ELEVATED BLOOD PRESSURE READING WITH DIAGNOSIS OF HYPERTENSION: ICD-10-CM

## 2020-03-27 LAB
ALBUMIN SERPL-MCNC: 4.4 G/DL (ref 3.5–5.2)
ALBUMIN/GLOB SERPL: 1 G/DL
ALP SERPL-CCNC: 59 U/L (ref 39–117)
ALT SERPL W P-5'-P-CCNC: 11 U/L (ref 1–41)
ANION GAP SERPL CALCULATED.3IONS-SCNC: 9 MMOL/L (ref 5–15)
AST SERPL-CCNC: 16 U/L (ref 1–40)
BACTERIA UR QL AUTO: NORMAL /HPF
BASOPHILS # BLD AUTO: 0.02 10*3/MM3 (ref 0–0.2)
BASOPHILS NFR BLD AUTO: 0.7 % (ref 0–1.5)
BILIRUB SERPL-MCNC: 0.5 MG/DL (ref 0.2–1.2)
BILIRUB UR QL STRIP: NEGATIVE
BUN BLD-MCNC: 10 MG/DL (ref 8–23)
BUN/CREAT SERPL: 12.8 (ref 7–25)
CALCIUM SPEC-SCNC: 8.7 MG/DL (ref 8.6–10.5)
CHLORIDE SERPL-SCNC: 106 MMOL/L (ref 98–107)
CLARITY UR: ABNORMAL
CO2 SERPL-SCNC: 25 MMOL/L (ref 22–29)
COLOR UR: YELLOW
CREAT BLD-MCNC: 0.78 MG/DL (ref 0.76–1.27)
DEPRECATED RDW RBC AUTO: 46.7 FL (ref 37–54)
EOSINOPHIL # BLD AUTO: 0.18 10*3/MM3 (ref 0–0.4)
EOSINOPHIL NFR BLD AUTO: 6.4 % (ref 0.3–6.2)
ERYTHROCYTE [DISTWIDTH] IN BLOOD BY AUTOMATED COUNT: 13.6 % (ref 12.3–15.4)
GFR SERPL CREATININE-BSD FRML MDRD: 120 ML/MIN/1.73
GLOBULIN UR ELPH-MCNC: 4.3 GM/DL
GLUCOSE BLD-MCNC: 100 MG/DL (ref 65–99)
GLUCOSE UR STRIP-MCNC: ABNORMAL MG/DL
HCT VFR BLD AUTO: 40.7 % (ref 37.5–51)
HGB BLD-MCNC: 13.4 G/DL (ref 13–17.7)
HGB UR QL STRIP.AUTO: NEGATIVE
HOLD SPECIMEN: NORMAL
HOLD SPECIMEN: NORMAL
HYALINE CASTS UR QL AUTO: NORMAL /LPF
IMM GRANULOCYTES # BLD AUTO: 0 10*3/MM3 (ref 0–0.05)
IMM GRANULOCYTES NFR BLD AUTO: 0 % (ref 0–0.5)
KETONES UR QL STRIP: ABNORMAL
LEUKOCYTE ESTERASE UR QL STRIP.AUTO: NEGATIVE
LIPASE SERPL-CCNC: 38 U/L (ref 13–60)
LYMPHOCYTES # BLD AUTO: 1.81 10*3/MM3 (ref 0.7–3.1)
LYMPHOCYTES NFR BLD AUTO: 64.6 % (ref 19.6–45.3)
MCH RBC QN AUTO: 30.9 PG (ref 26.6–33)
MCHC RBC AUTO-ENTMCNC: 32.9 G/DL (ref 31.5–35.7)
MCV RBC AUTO: 93.8 FL (ref 79–97)
MONOCYTES # BLD AUTO: 0.29 10*3/MM3 (ref 0.1–0.9)
MONOCYTES NFR BLD AUTO: 10.4 % (ref 5–12)
NEUTROPHILS # BLD AUTO: 0.5 10*3/MM3 (ref 1.7–7)
NEUTROPHILS NFR BLD AUTO: 17.9 % (ref 42.7–76)
NITRITE UR QL STRIP: NEGATIVE
NRBC BLD AUTO-RTO: 0 /100 WBC (ref 0–0.2)
PH UR STRIP.AUTO: 5.5 [PH] (ref 5–8)
PLAT MORPH BLD: NORMAL
PLATELET # BLD AUTO: 271 10*3/MM3 (ref 140–450)
PMV BLD AUTO: 9.1 FL (ref 6–12)
POTASSIUM BLD-SCNC: 4.2 MMOL/L (ref 3.5–5.2)
PROT SERPL-MCNC: 8.7 G/DL (ref 6–8.5)
PROT UR QL STRIP: NEGATIVE
RBC # BLD AUTO: 4.34 10*6/MM3 (ref 4.14–5.8)
RBC # UR: NORMAL /HPF
RBC MORPH BLD: NORMAL
REF LAB TEST METHOD: NORMAL
SODIUM BLD-SCNC: 140 MMOL/L (ref 136–145)
SP GR UR STRIP: 1.03 (ref 1–1.03)
SQUAMOUS #/AREA URNS HPF: NORMAL /HPF
UROBILINOGEN UR QL STRIP: ABNORMAL
WBC MORPH BLD: NORMAL
WBC NRBC COR # BLD: 2.8 10*3/MM3 (ref 3.4–10.8)
WBC UR QL AUTO: NORMAL /HPF
WHOLE BLOOD HOLD SPECIMEN: NORMAL
WHOLE BLOOD HOLD SPECIMEN: NORMAL

## 2020-03-27 PROCEDURE — 99284 EMERGENCY DEPT VISIT MOD MDM: CPT

## 2020-03-27 PROCEDURE — 83690 ASSAY OF LIPASE: CPT | Performed by: EMERGENCY MEDICINE

## 2020-03-27 PROCEDURE — 74176 CT ABD & PELVIS W/O CONTRAST: CPT

## 2020-03-27 PROCEDURE — 85007 BL SMEAR W/DIFF WBC COUNT: CPT | Performed by: EMERGENCY MEDICINE

## 2020-03-27 PROCEDURE — 80053 COMPREHEN METABOLIC PANEL: CPT | Performed by: EMERGENCY MEDICINE

## 2020-03-27 PROCEDURE — 85025 COMPLETE CBC W/AUTO DIFF WBC: CPT | Performed by: EMERGENCY MEDICINE

## 2020-03-27 PROCEDURE — 81001 URINALYSIS AUTO W/SCOPE: CPT | Performed by: EMERGENCY MEDICINE

## 2020-03-27 RX ORDER — SODIUM CHLORIDE 0.9 % (FLUSH) 0.9 %
10 SYRINGE (ML) INJECTION AS NEEDED
Status: DISCONTINUED | OUTPATIENT
Start: 2020-03-27 | End: 2020-03-27 | Stop reason: HOSPADM

## 2020-03-27 RX ORDER — OXYCODONE AND ACETAMINOPHEN 7.5; 325 MG/1; MG/1
1 TABLET ORAL EVERY 6 HOURS PRN
Qty: 8 TABLET | Refills: 0 | Status: SHIPPED | OUTPATIENT
Start: 2020-03-27 | End: 2021-11-18

## 2020-03-27 RX ORDER — OXYCODONE AND ACETAMINOPHEN 7.5; 325 MG/1; MG/1
1 TABLET ORAL ONCE
Status: COMPLETED | OUTPATIENT
Start: 2020-03-27 | End: 2020-03-27

## 2020-03-27 RX ADMIN — SODIUM CHLORIDE 1000 ML: 9 INJECTION, SOLUTION INTRAVENOUS at 09:31

## 2020-03-27 RX ADMIN — OXYCODONE HYDROCHLORIDE AND ACETAMINOPHEN 1 TABLET: 7.5; 325 TABLET ORAL at 10:48

## 2020-03-27 NOTE — TELEPHONE ENCOUNTER
----- Message from Cheri Wong MD sent at 3/27/2020 11:14 AM EDT -----  Regarding: televisit next week  Please put him on schedule for a televisit next week.    Thanks.

## 2020-03-27 NOTE — TELEPHONE ENCOUNTER
Patient was contacted to have a televisit appointment per Dr. Wong.  Patient does not have a cell phone or computer, just home phone.  I told him that it was fine and that Dr. Wong would call him on the telephone for a visit.  He said he would be available at the appointment time I gave him of 11:30am, Thursday April 2.

## 2020-04-02 ENCOUNTER — TELEMEDICINE (OUTPATIENT)
Dept: ONCOLOGY | Facility: CLINIC | Age: 70
End: 2020-04-02

## 2020-04-02 DIAGNOSIS — D47.2 IGG MONOCLONAL PROTEIN DISORDER: ICD-10-CM

## 2020-04-02 DIAGNOSIS — M54.9 ACUTE RIGHT-SIDED BACK PAIN, UNSPECIFIED BACK LOCATION: Primary | ICD-10-CM

## 2020-04-02 PROCEDURE — 99214 OFFICE O/P EST MOD 30 MIN: CPT | Performed by: INTERNAL MEDICINE

## 2020-04-02 NOTE — PROGRESS NOTES
Visit conducted via telephone.  Start time 11:36.  End time 11:51.      PROBLEM LIST:  1. Monoclonal protein diagnosed in 2005 previously followed by Dr. Usama Jones, IgG:   a) Bone marrow biopsy on 03/10/2005 showed a normocellular bone marrow with normal hematopoiesis with no evidence of multiple myeloma or lymphoma, plasma cells 3%.   b) Reevaluation on 09/16/2014 showed a normal CBC, normal calcium, normal kidney function, IgG lambda monoclonal protein of 1.6 g/dL, kappa lambda ratio of 0.15, spot urine immunofixation with IgG lambda.   c) Repeat bone marrow biopsy on 03/01/2016 showed a normocellular marrow with 3 to 5% plasma cells. The plasma cells had a mild relative lambda excess. There was no evidence of amyloid deposition on Congo red stain.  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  2. Chronic pain.   3. Hypertension.    Subjective      CC: MGUS    HISTORY OF PRESENT ILLNESS:   Jh Bryan returns for follow-up.       He was see in the ED recently for pain - it is located at the bottom of ribs and hip on right side.  Severe, takes his breath away.  This has been going on for about amonth - he hasn't had pain like this before.    He had a CT a/p in the ED which was unremarkable.  He was treatd for prostatitis.  He does also report some difficulty urinating.      He also reports he has been losing weight and generally feels pretty bad.    Past Medical History, Past Surgical History, Social History, Family History have been reviewed and are without significant changes except as mentioned.    Review of Systems   A comprehensive 14 point review of systems was performed and was negative except as mentioned.    Medications:  The current medication list was reviewed in the EMR    ALLERGIES:  No Known Allergies    Objective      There were no vitals taken for this visit.     Performance Status: 1    Neuro: alert and oriented.  Psych: mood and affect appropriate    Lab Results   Component Value Date    WBC 2.80 (L)  03/27/2020    HGB 13.4 03/27/2020    HCT 40.7 03/27/2020    MCV 93.8 03/27/2020     03/27/2020     Lab Results   Component Value Date    GLUCOSE 100 (H) 03/27/2020    BUN 10 03/27/2020    CREATININE 0.78 03/27/2020    EGFRIFAFRI 120 03/27/2020    BCR 12.8 03/27/2020    K 4.2 03/27/2020    CO2 25.0 03/27/2020    CALCIUM 8.7 03/27/2020    PROTENTOTREF 8.0 11/08/2019    ALBUMIN 4.40 03/27/2020    LABIL2 1.1 11/08/2019    AST 16 03/27/2020    ALT 11 03/27/2020     CT Abdomen Pelvis Without Contrast  Narrative: EXAMINATION: CT ABDOMEN PELVIS WO CONTRAST-      INDICATION: RUQ pain     TECHNIQUE: 5 mm unenhanced images through the abdomen and pelvis     The radiation dose reduction device was turned on for each scan per the  ALARA (As Low as Reasonably Achievable) protocol.     COMPARISON: None.     FINDINGS:     The included lung bases appear grossly clear. No pleural effusion is  seen. There are granulomatous calcifications in the liver and spleen and  mild fatty liver change. No significant abnormalities are noted of the  gallbladder, pancreas, adrenal glands, or kidneys for a non-IV contrast  enhanced exam. No upper abdominal free air, ascites, adenopathy, or  acute inflammatory change is seen. Large and small bowel loops are  normal in caliber and normal in appearance.     Regarding the lower abdomen and pelvis, the appendix is air-filled,  medial of the cecal tip and appears normal. The terminal ileum and cecum  appear grossly normal. Bladder is moderately distended and normal in  appearance. No intrapelvic free fluid or acute inflammatory change is  seen. Overall, there appears to be mild fecal stasis.        Impression: Mild fecal stasis. No evidence of acute inflammatory process  or other clearly acute intra-abdominal or intrapelvic disease.        This report was finalized on 3/27/2020 9:55 AM by Dr. Arias Isaac MD.          I personally reviewed the CT images.    Assessment/Plan   Jh Bryan is a 69  y.o. year old male with IgG monoclonal gammopathy who returns for follow up.    Back pain with numbness in feet: MRI thoracic and lumbar spine.  Will need to be done before he has a pacemaker placed. I will contact him with results.    MGUS: this has been stable on recent labs.  Will recheck tomorrow when he comes for imaging.    Urinary retention: likely BPH, but need to evaluate for any spinal cord issues given other symptoms.    Bradycardia: scheduled for pacemaker placement Tuesday.        F/u currently scheduled for November.  Will adjust as needed based on imaging results.      Cheri Wong MD    4/2/2020          CC:

## 2020-04-06 ENCOUNTER — HOSPITAL ENCOUNTER (OUTPATIENT)
Dept: MRI IMAGING | Facility: HOSPITAL | Age: 70
Discharge: HOME | End: 2020-04-06

## 2020-04-06 ENCOUNTER — HOSPITAL ENCOUNTER (OUTPATIENT)
Dept: MRI IMAGING | Facility: HOSPITAL | Age: 70
Discharge: HOME | End: 2020-04-06
Admitting: INTERNAL MEDICINE

## 2020-04-06 DIAGNOSIS — M54.9 ACUTE RIGHT-SIDED BACK PAIN, UNSPECIFIED BACK LOCATION: ICD-10-CM

## 2020-04-06 PROCEDURE — 72158 MRI LUMBAR SPINE W/O & W/DYE: CPT

## 2020-04-06 PROCEDURE — 72157 MRI CHEST SPINE W/O & W/DYE: CPT

## 2020-04-06 PROCEDURE — A9577 INJ MULTIHANCE: HCPCS | Performed by: INTERNAL MEDICINE

## 2020-04-06 PROCEDURE — 0 GADOBENATE DIMEGLUMINE 529 MG/ML SOLUTION: Performed by: INTERNAL MEDICINE

## 2020-04-06 RX ADMIN — GADOBENATE DIMEGLUMINE 14 ML: 529 INJECTION, SOLUTION INTRAVENOUS at 15:07

## 2020-04-08 ENCOUNTER — TELEPHONE (OUTPATIENT)
Dept: ONCOLOGY | Facility: CLINIC | Age: 70
End: 2020-04-08

## 2020-04-08 NOTE — TELEPHONE ENCOUNTER
----- Message from Cheri Wong MD sent at 4/7/2020  4:23 PM EDT -----  hes getting a pacemaker today.  Please call wed or thurs and let him know MRI of spine does not show any lesions or major issues.  Some mild arthritis changes.  Nothing that suggests myeloma or other cancers.    Would recommend he keep the appointment with neurology that his PCP had ordered.    Thx.    ----- Message -----  From: [place], Rad Results [place] In  Sent: 4/7/2020   4:13 PM EDT  To: Cheri Wong MD

## 2020-04-08 NOTE — TELEPHONE ENCOUNTER
Per Dr. Wong, I called patient to let him know his MRI did not show any cancer or lesions only some mild arthritic changes.  I did instruct patient to keep his follow up with neurology that his pcp had set up.  Patient verbalized understanding.

## 2020-11-18 ENCOUNTER — LAB (OUTPATIENT)
Dept: LAB | Facility: HOSPITAL | Age: 70
End: 2020-11-18

## 2020-11-18 ENCOUNTER — OFFICE VISIT (OUTPATIENT)
Dept: ONCOLOGY | Facility: CLINIC | Age: 70
End: 2020-11-18

## 2020-11-18 VITALS
TEMPERATURE: 97.5 F | SYSTOLIC BLOOD PRESSURE: 123 MMHG | HEART RATE: 52 BPM | DIASTOLIC BLOOD PRESSURE: 65 MMHG | WEIGHT: 149 LBS | HEIGHT: 72 IN | BODY MASS INDEX: 20.18 KG/M2 | OXYGEN SATURATION: 100 % | RESPIRATION RATE: 18 BRPM

## 2020-11-18 DIAGNOSIS — D47.2 IGG MONOCLONAL PROTEIN DISORDER: Primary | ICD-10-CM

## 2020-11-18 DIAGNOSIS — D47.2 IGG MONOCLONAL PROTEIN DISORDER: ICD-10-CM

## 2020-11-18 DIAGNOSIS — R39.9 LOWER URINARY TRACT SYMPTOMS (LUTS): ICD-10-CM

## 2020-11-18 LAB
ALBUMIN SERPL-MCNC: 4.7 G/DL (ref 3.5–5.2)
ALBUMIN/GLOB SERPL: 1.3 G/DL
ALP SERPL-CCNC: 59 U/L (ref 39–117)
ALT SERPL W P-5'-P-CCNC: 17 U/L (ref 1–41)
ANION GAP SERPL CALCULATED.3IONS-SCNC: 7 MMOL/L (ref 5–15)
AST SERPL-CCNC: 17 U/L (ref 1–40)
BILIRUB SERPL-MCNC: 0.5 MG/DL (ref 0–1.2)
BUN SERPL-MCNC: 10 MG/DL (ref 8–23)
BUN/CREAT SERPL: 13.3 (ref 7–25)
CALCIUM SPEC-SCNC: 9.4 MG/DL (ref 8.6–10.5)
CHLORIDE SERPL-SCNC: 105 MMOL/L (ref 98–107)
CO2 SERPL-SCNC: 28 MMOL/L (ref 22–29)
CREAT SERPL-MCNC: 0.75 MG/DL (ref 0.76–1.27)
ERYTHROCYTE [DISTWIDTH] IN BLOOD BY AUTOMATED COUNT: 15.3 % (ref 12.3–15.4)
GFR SERPL CREATININE-BSD FRML MDRD: 125 ML/MIN/1.73
GLOBULIN UR ELPH-MCNC: 3.6 GM/DL
GLUCOSE SERPL-MCNC: 84 MG/DL (ref 65–99)
HCT VFR BLD AUTO: 38.7 % (ref 37.5–51)
HGB BLD-MCNC: 12.8 G/DL (ref 13–17.7)
LYMPHOCYTES # BLD AUTO: 1.3 10*3/MM3 (ref 0.7–3.1)
LYMPHOCYTES NFR BLD AUTO: 66.6 % (ref 19.6–45.3)
MCH RBC QN AUTO: 31 PG (ref 26.6–33)
MCHC RBC AUTO-ENTMCNC: 33.1 G/DL (ref 31.5–35.7)
MCV RBC AUTO: 93.7 FL (ref 79–97)
MONOCYTES # BLD AUTO: 0.1 10*3/MM3 (ref 0.1–0.9)
MONOCYTES NFR BLD AUTO: 6.4 % (ref 5–12)
NEUTROPHILS NFR BLD AUTO: 0.5 10*3/MM3 (ref 1.7–7)
NEUTROPHILS NFR BLD AUTO: 27 % (ref 42.7–76)
PLATELET # BLD AUTO: 269 10*3/MM3 (ref 140–450)
PMV BLD AUTO: 6.9 FL (ref 6–12)
POTASSIUM SERPL-SCNC: 4.5 MMOL/L (ref 3.5–5.2)
PROT SERPL-MCNC: 8.3 G/DL (ref 6–8.5)
RBC # BLD AUTO: 4.12 10*6/MM3 (ref 4.14–5.8)
SODIUM SERPL-SCNC: 140 MMOL/L (ref 136–145)
WBC # BLD AUTO: 2 10*3/MM3 (ref 3.4–10.8)

## 2020-11-18 PROCEDURE — 99213 OFFICE O/P EST LOW 20 MIN: CPT | Performed by: INTERNAL MEDICINE

## 2020-11-18 PROCEDURE — 85025 COMPLETE CBC W/AUTO DIFF WBC: CPT

## 2020-11-18 PROCEDURE — 80053 COMPREHEN METABOLIC PANEL: CPT

## 2020-11-18 PROCEDURE — 36415 COLL VENOUS BLD VENIPUNCTURE: CPT

## 2020-11-18 PROCEDURE — 84165 PROTEIN E-PHORESIS SERUM: CPT

## 2020-11-18 PROCEDURE — 86334 IMMUNOFIX E-PHORESIS SERUM: CPT

## 2020-11-18 PROCEDURE — 82784 ASSAY IGA/IGD/IGG/IGM EACH: CPT

## 2020-11-18 RX ORDER — TAMSULOSIN HYDROCHLORIDE 0.4 MG/1
CAPSULE ORAL
COMMUNITY
Start: 2020-10-07 | End: 2021-05-10 | Stop reason: SDUPTHER

## 2020-11-19 ENCOUNTER — HOSPITAL ENCOUNTER (OUTPATIENT)
Dept: GENERAL RADIOLOGY | Facility: HOSPITAL | Age: 70
Discharge: HOME | End: 2020-11-19
Admitting: INTERNAL MEDICINE

## 2020-11-19 DIAGNOSIS — D47.2 IGG MONOCLONAL PROTEIN DISORDER: ICD-10-CM

## 2020-11-19 LAB
ALBUMIN SERPL ELPH-MCNC: 4.2 G/DL (ref 2.9–4.4)
ALBUMIN/GLOB SERPL: 1.2 {RATIO} (ref 0.7–1.7)
ALPHA1 GLOB SERPL ELPH-MCNC: 0.3 G/DL (ref 0–0.4)
ALPHA2 GLOB SERPL ELPH-MCNC: 0.6 G/DL (ref 0.4–1)
B-GLOBULIN SERPL ELPH-MCNC: 0.7 G/DL (ref 0.7–1.3)
GAMMA GLOB SERPL ELPH-MCNC: 2.2 G/DL (ref 0.4–1.8)
GLOBULIN SER-MCNC: 3.8 G/DL (ref 2.2–3.9)
IGA SERPL-MCNC: 30 MG/DL (ref 61–437)
IGG SERPL-MCNC: 2959 MG/DL (ref 603–1613)
IGM SERPL-MCNC: 26 MG/DL (ref 20–172)
INTERPRETATION SERPL IEP-IMP: ABNORMAL
LABORATORY COMMENT REPORT: ABNORMAL
M PROTEIN SERPL ELPH-MCNC: 1.9 G/DL
PROT SERPL-MCNC: 8 G/DL (ref 6–8.5)

## 2020-11-19 PROCEDURE — 73502 X-RAY EXAM HIP UNI 2-3 VIEWS: CPT

## 2020-11-20 ENCOUNTER — TELEPHONE (OUTPATIENT)
Dept: ONCOLOGY | Facility: CLINIC | Age: 70
End: 2020-11-20

## 2020-11-20 NOTE — TELEPHONE ENCOUNTER
----- Message from Cheri Wong MD sent at 11/20/2020  9:31 AM EST -----  Please let him know his labs show a stable M protein level, no evidence of myeloma.    His xray of the hip shows some arthritis.  We can refer him to ortho if he wants for evalaution of this.  Would send to ryan.    Marylu.    ----- Message -----  From: Interface, Rad Results [place] In  Sent: 11/19/2020   6:45 PM EST  To: Cheri Wong MD

## 2020-11-20 NOTE — TELEPHONE ENCOUNTER
Per Dr. Wong, called patient to let him know that his lab work showed stable m protein so no evidence of myeloma.  The hip xray did show arthritis and we can refer to ortho if he likes.  I talked to his sig other and she verbalized understanding and said that she would ask him about the ortho referral.

## 2021-04-05 ENCOUNTER — IMMUNIZATION (OUTPATIENT)
Dept: VACCINE CLINIC | Facility: HOSPITAL | Age: 71
End: 2021-04-05

## 2021-04-05 PROCEDURE — 0001A: CPT | Performed by: INTERNAL MEDICINE

## 2021-04-05 PROCEDURE — 91300 HC SARSCOV02 VAC 30MCG/0.3ML IM: CPT | Performed by: INTERNAL MEDICINE

## 2021-04-26 ENCOUNTER — IMMUNIZATION (OUTPATIENT)
Dept: VACCINE CLINIC | Facility: HOSPITAL | Age: 71
End: 2021-04-26

## 2021-04-26 PROCEDURE — 0002A: CPT | Performed by: INTERNAL MEDICINE

## 2021-04-26 PROCEDURE — 91300 HC SARSCOV02 VAC 30MCG/0.3ML IM: CPT | Performed by: INTERNAL MEDICINE

## 2021-05-10 ENCOUNTER — OFFICE VISIT (OUTPATIENT)
Dept: UROLOGY | Age: 71
End: 2021-05-10

## 2021-05-10 VITALS
RESPIRATION RATE: 18 BRPM | BODY MASS INDEX: 20.18 KG/M2 | HEART RATE: 68 BPM | SYSTOLIC BLOOD PRESSURE: 128 MMHG | DIASTOLIC BLOOD PRESSURE: 60 MMHG | OXYGEN SATURATION: 97 % | HEIGHT: 72 IN | WEIGHT: 149 LBS

## 2021-05-10 DIAGNOSIS — R39.9 LOWER URINARY TRACT SYMPTOMS (LUTS): ICD-10-CM

## 2021-05-10 DIAGNOSIS — R35.1 NOCTURIA: ICD-10-CM

## 2021-05-10 DIAGNOSIS — R35.0 URINARY FREQUENCY: Primary | ICD-10-CM

## 2021-05-10 LAB
BILIRUB BLD-MCNC: NEGATIVE MG/DL
CLARITY, POC: CLEAR
COLOR UR: YELLOW
GLUCOSE UR STRIP-MCNC: NEGATIVE MG/DL
KETONES UR QL: NEGATIVE
LEUKOCYTE EST, POC: NEGATIVE
NITRITE UR-MCNC: NEGATIVE MG/ML
PH UR: 6 [PH] (ref 5–8)
PROT UR STRIP-MCNC: NEGATIVE MG/DL
RBC # UR STRIP: NEGATIVE /UL
SP GR UR: 1.02 (ref 1–1.03)
UROBILINOGEN UR QL: NORMAL

## 2021-05-10 PROCEDURE — 99204 OFFICE O/P NEW MOD 45 MIN: CPT | Performed by: UROLOGY

## 2021-05-10 PROCEDURE — 51798 US URINE CAPACITY MEASURE: CPT | Performed by: UROLOGY

## 2021-05-10 RX ORDER — CITALOPRAM 20 MG/1
TABLET ORAL
COMMUNITY
Start: 2021-04-01 | End: 2021-11-18

## 2021-05-10 RX ORDER — TAMSULOSIN HYDROCHLORIDE 0.4 MG/1
1 CAPSULE ORAL DAILY
Qty: 30 CAPSULE | Refills: 11 | Status: SHIPPED | OUTPATIENT
Start: 2021-05-10 | End: 2022-05-31

## 2021-05-10 NOTE — PROGRESS NOTES
Chief Complaint  LUTS    Referring Provider  Cheri Wong MD    HPI  Mr. Bryan is a 70 y.o. male with history of chronic pain, hypertension, IgG monoclonal antibody protein disorder who presents with lower urinary tract symptoms.  Primary symptom includes: frequency, urgency, weak stream, straining, nocturia. He also notes urine has a strong odor.   Patient denies gross hematuria or dysuria. He denies any fever.   Onset was around 1 year ago and have been gradual.     Previous treatments include: tamsulosin    He states that his primary care checked his prostate twice and saw it was swollen and infected around 3 months ago. The patient states his primary stated it was infected based on a finger exam and is unsure if he checked his labs. His primary care provider gave him Flomax at that time and he is now dependent upon it to control his urinary symptoms.     IPSS Questionnaire (AUA-7):  Over the past month…    1)  Incomplete Emptying  How often have you had a sensation of not emptying your bladder?  5 - Almost always   2)  Frequency  How often have you had to urinate less than every two hours? 5 - Almost always   3)  Intermittency  How often have you found you stopped and started again several times when you urinated?  5 - Almost always   4) Urgency  How often have you found it difficult to postpone urination?  5 - Almost always   5) Weak Stream  How often have you had a weak urinary stream?  5 - Almost always   6) Straining  How often have you had to push or strain to begin urination?  5 - Almost always   7) Nocturia  How many times did you typically get up at night to urinate?  5 - 5+ times   Total Score:  35       Quality of life due to urinary symptoms:  If you were to spend the rest of your life with your urinary condition the way it is now, how would you feel about that? 5-Unhappy   Urine Leakage (Incontinence) 0-No Leakage       Past Medical History  History reviewed. No pertinent past medical  "history.    Past Surgical History  Past Surgical History:   Procedure Laterality Date   • BONE MARROW BIOPSY  03/10/2005    Showed normocellular bone marrow with normal hematpoiesis.   • PACEMAKER IMPLANTATION Left 03/11/2021   • TOOTH EXTRACTION Right 11/2016    bottom right tooth 2 or 3rd from the middle to the right       Medications    Current Outpatient Medications:   •  tamsulosin (FLOMAX) 0.4 MG capsule 24 hr capsule, , Disp: , Rfl:   •  citalopram (CeleXA) 20 MG tablet, , Disp: , Rfl:   •  oxyCODONE-acetaminophen (PERCOCET) 7.5-325 MG per tablet, Take 1 tablet by mouth Every 6 (Six) Hours As Needed (Pain)., Disp: 8 tablet, Rfl: 0    Allergies  No Known Allergies    Social History  Social History     Socioeconomic History   • Marital status:      Spouse name: Not on file   • Number of children: Not on file   • Years of education: Not on file   • Highest education level: Not on file   Tobacco Use   • Smoking status: Former Smoker   • Smokeless tobacco: Never Used   Substance and Sexual Activity   • Alcohol use: No   • Drug use: Yes     Types: Marijuana     Comment: Daily   • Sexual activity: Defer       Family History  He has no family history of prostate cancer  History reviewed. No pertinent family history.    Review of Systems  A review of systems was notable for gammopathy.    Physical Exam  Visit Vitals  /60   Pulse 68   Resp 18   Ht 182.9 cm (72\")   Wt 67.6 kg (149 lb)   SpO2 97%   BMI 20.21 kg/m²     Physical exam notable for normal habitus.      Labs  No results found for: PSA    Brief Urine Lab Results  (Last result in the past 365 days)      Color   Clarity   Blood   Leuk Est   Nitrite   Protein   CREAT   Urine HCG        05/10/21 1036 Yellow Clear Negative Negative Negative Negative             PVR  Post-void residual performed by staff - 36 mL.      Assessment  Mr. Bryan is a 70 y.o. male who presents with acute worsening of chronic LUTS, primarily weak stream, dribbling, straining, " nocturia.    Plan  1.  Follow up for cystoscopy, TRUS, Uroflow, ERNIE, GE. His risk factors for complications include blood dyscrasias and possible elevated risk for infection.  2. Continue tamsulosin. I have refilled it. We did discuss minimally invasive surgical technologies to possibly free him from this medication in the future.      Scribed for Doroteo Taylor MD by Chrissy Moore.  05/10/21   13:45 EDT    I have personally performed the services described in this document as scribed by the above individual, and it is both accurate and complete.  Doroteo Taylor MD  5/10/2021  21:12 EDT

## 2021-05-17 ENCOUNTER — LAB (OUTPATIENT)
Dept: LAB | Facility: HOSPITAL | Age: 71
End: 2021-05-17

## 2021-05-17 DIAGNOSIS — D47.2 IGG MONOCLONAL PROTEIN DISORDER: ICD-10-CM

## 2021-05-17 LAB
ALBUMIN SERPL-MCNC: 4.3 G/DL (ref 3.5–5.2)
ALBUMIN/GLOB SERPL: 1.1 G/DL
ALP SERPL-CCNC: 60 U/L (ref 39–117)
ALT SERPL W P-5'-P-CCNC: 9 U/L (ref 1–41)
ANION GAP SERPL CALCULATED.3IONS-SCNC: 7 MMOL/L (ref 5–15)
AST SERPL-CCNC: 17 U/L (ref 1–40)
BILIRUB SERPL-MCNC: 0.4 MG/DL (ref 0–1.2)
BUN SERPL-MCNC: 9 MG/DL (ref 8–23)
BUN/CREAT SERPL: 10.8 (ref 7–25)
CALCIUM SPEC-SCNC: 9.3 MG/DL (ref 8.6–10.5)
CHLORIDE SERPL-SCNC: 105 MMOL/L (ref 98–107)
CO2 SERPL-SCNC: 28 MMOL/L (ref 22–29)
CREAT SERPL-MCNC: 0.83 MG/DL (ref 0.76–1.27)
ERYTHROCYTE [DISTWIDTH] IN BLOOD BY AUTOMATED COUNT: 14.6 % (ref 12.3–15.4)
GFR SERPL CREATININE-BSD FRML MDRD: 111 ML/MIN/1.73
GLOBULIN UR ELPH-MCNC: 3.9 GM/DL
GLUCOSE SERPL-MCNC: 81 MG/DL (ref 65–99)
HCT VFR BLD AUTO: 38.3 % (ref 37.5–51)
HGB BLD-MCNC: 12.5 G/DL (ref 13–17.7)
LYMPHOCYTES # BLD AUTO: 1.2 10*3/MM3 (ref 0.7–3.1)
LYMPHOCYTES NFR BLD AUTO: 48.2 % (ref 19.6–45.3)
MCH RBC QN AUTO: 29.6 PG (ref 26.6–33)
MCHC RBC AUTO-ENTMCNC: 32.7 G/DL (ref 31.5–35.7)
MCV RBC AUTO: 90.6 FL (ref 79–97)
MONOCYTES # BLD AUTO: 0.2 10*3/MM3 (ref 0.1–0.9)
MONOCYTES NFR BLD AUTO: 6.4 % (ref 5–12)
NEUTROPHILS NFR BLD AUTO: 1.1 10*3/MM3 (ref 1.7–7)
NEUTROPHILS NFR BLD AUTO: 45.4 % (ref 42.7–76)
PLATELET # BLD AUTO: 286 10*3/MM3 (ref 140–450)
PMV BLD AUTO: 6.1 FL (ref 6–12)
POTASSIUM SERPL-SCNC: 4 MMOL/L (ref 3.5–5.2)
PROT SERPL-MCNC: 8.2 G/DL (ref 6–8.5)
RBC # BLD AUTO: 4.23 10*6/MM3 (ref 4.14–5.8)
SODIUM SERPL-SCNC: 140 MMOL/L (ref 136–145)
WBC # BLD AUTO: 2.4 10*3/MM3 (ref 3.4–10.8)

## 2021-05-17 PROCEDURE — 83883 ASSAY NEPHELOMETRY NOT SPEC: CPT

## 2021-05-17 PROCEDURE — 82784 ASSAY IGA/IGD/IGG/IGM EACH: CPT

## 2021-05-17 PROCEDURE — 84153 ASSAY OF PSA TOTAL: CPT | Performed by: UROLOGY

## 2021-05-17 PROCEDURE — 80053 COMPREHEN METABOLIC PANEL: CPT

## 2021-05-17 PROCEDURE — 85025 COMPLETE CBC W/AUTO DIFF WBC: CPT

## 2021-05-17 PROCEDURE — 86334 IMMUNOFIX E-PHORESIS SERUM: CPT

## 2021-05-17 PROCEDURE — 36415 COLL VENOUS BLD VENIPUNCTURE: CPT

## 2021-05-17 PROCEDURE — 84165 PROTEIN E-PHORESIS SERUM: CPT

## 2021-05-18 LAB
ALBUMIN SERPL ELPH-MCNC: 4 G/DL (ref 2.9–4.4)
ALBUMIN/GLOB SERPL: 1.1 {RATIO} (ref 0.7–1.7)
ALPHA1 GLOB SERPL ELPH-MCNC: 0.3 G/DL (ref 0–0.4)
ALPHA2 GLOB SERPL ELPH-MCNC: 0.7 G/DL (ref 0.4–1)
B-GLOBULIN SERPL ELPH-MCNC: 0.8 G/DL (ref 0.7–1.3)
GAMMA GLOB SERPL ELPH-MCNC: 2.3 G/DL (ref 0.4–1.8)
GLOBULIN SER-MCNC: 4 G/DL (ref 2.2–3.9)
IGA SERPL-MCNC: 29 MG/DL (ref 61–437)
IGG SERPL-MCNC: 2592 MG/DL (ref 603–1613)
IGM SERPL-MCNC: 23 MG/DL (ref 20–172)
INTERPRETATION SERPL IEP-IMP: ABNORMAL
KAPPA LC FREE SER-MCNC: 8.6 MG/L (ref 3.3–19.4)
KAPPA LC FREE/LAMBDA FREE SER: 0.08 {RATIO} (ref 0.26–1.65)
LABORATORY COMMENT REPORT: ABNORMAL
LAMBDA LC FREE SERPL-MCNC: 113.6 MG/L (ref 5.7–26.3)
M PROTEIN SERPL ELPH-MCNC: 1.9 G/DL
PROT SERPL-MCNC: 8 G/DL (ref 6–8.5)

## 2021-05-20 ENCOUNTER — OFFICE VISIT (OUTPATIENT)
Dept: ONCOLOGY | Facility: CLINIC | Age: 71
End: 2021-05-20

## 2021-05-20 ENCOUNTER — LAB (OUTPATIENT)
Dept: LAB | Facility: HOSPITAL | Age: 71
End: 2021-05-20

## 2021-05-20 VITALS
WEIGHT: 156 LBS | RESPIRATION RATE: 18 BRPM | BODY MASS INDEX: 21.13 KG/M2 | HEIGHT: 72 IN | OXYGEN SATURATION: 98 % | HEART RATE: 75 BPM | DIASTOLIC BLOOD PRESSURE: 63 MMHG | TEMPERATURE: 98 F | SYSTOLIC BLOOD PRESSURE: 128 MMHG

## 2021-05-20 DIAGNOSIS — D64.9 ANEMIA, UNSPECIFIED TYPE: ICD-10-CM

## 2021-05-20 DIAGNOSIS — D47.2 IGG MONOCLONAL PROTEIN DISORDER: ICD-10-CM

## 2021-05-20 DIAGNOSIS — D47.2 IGG MONOCLONAL PROTEIN DISORDER: Primary | ICD-10-CM

## 2021-05-20 LAB
FERRITIN SERPL-MCNC: 221.4 NG/ML (ref 30–400)
IRON 24H UR-MRATE: 77 MCG/DL (ref 59–158)
IRON SATN MFR SERPL: 24 % (ref 20–50)
TIBC SERPL-MCNC: 323 MCG/DL (ref 298–536)
TRANSFERRIN SERPL-MCNC: 217 MG/DL (ref 200–360)
VIT B12 BLD-MCNC: 363 PG/ML (ref 211–946)

## 2021-05-20 PROCEDURE — 82607 VITAMIN B-12: CPT

## 2021-05-20 PROCEDURE — 36415 COLL VENOUS BLD VENIPUNCTURE: CPT | Performed by: NURSE PRACTITIONER

## 2021-05-20 PROCEDURE — 82728 ASSAY OF FERRITIN: CPT | Performed by: NURSE PRACTITIONER

## 2021-05-20 PROCEDURE — 99212 OFFICE O/P EST SF 10 MIN: CPT | Performed by: NURSE PRACTITIONER

## 2021-05-20 PROCEDURE — 84466 ASSAY OF TRANSFERRIN: CPT | Performed by: NURSE PRACTITIONER

## 2021-05-20 PROCEDURE — 83540 ASSAY OF IRON: CPT | Performed by: NURSE PRACTITIONER

## 2021-05-20 NOTE — PROGRESS NOTES
"  PROBLEM LIST:  1. Monoclonal protein diagnosed in 2005 previously followed by Dr. Usama Jones, IgG:   a) Bone marrow biopsy on 03/10/2005 showed a normocellular bone marrow with normal hematopoiesis with no evidence of multiple myeloma or lymphoma, plasma cells 3%.   b) Reevaluation on 09/16/2014 showed a normal CBC, normal calcium, normal kidney function, IgG lambda monoclonal protein of 1.6 g/dL, kappa lambda ratio of 0.15, spot urine immunofixation with IgG lambda.   c) Repeat bone marrow biopsy on 03/01/2016 showed a normocellular marrow with 3 to 5% plasma cells. The plasma cells had a mild relative lambda excess. There was no evidence of amyloid deposition on Congo red stain.  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  2. Chronic pain.   3. Hypertension.    Subjective      CC: MGUS    HISTORY OF PRESENT ILLNESS:   Jh Bryan returns for follow-up.  He had a pacemaker placed March 11, 2021 at [facility].  He continues to have fatigue.  He continues to have generalized pain.  He denies any recurring fevers or infections.        Past Medical History, Past Surgical History, Social History, Family History have been reviewed and are without significant changes except as mentioned.    Review of Systems   A comprehensive 14 point review of systems was performed and was negative except as mentioned.    Medications:  The current medication list was reviewed in the EMR    ALLERGIES:  No Known Allergies    Objective      /63   Pulse 75   Temp 98 °F (36.7 °C) (Temporal)   Resp 18   Ht 182.9 cm (72.01\")   Wt 70.8 kg (156 lb)   SpO2 98%   BMI 21.15 kg/m²      Performance Status: 1    General: well appearing male in no acute distress  Neuro: alert and oriented  HEENT: sclera anicteric, oropharynx clear  Lymphatics: no cervical, supraclavicular, or axillary adenopathy  Cardiovascular: regular rate and rhythm, no murmurs  Lungs: clear to auscultation bilaterally  Abdomen: soft, nontender, nondistended.  " No palpable organomegaly  Extremeties: no lower extremity edema  Skin: no rashes, lesions, bruising, or petechiae  Psych: mood and affect appropriate      Lab Results   Component Value Date    WBC 2.40 (L) 05/17/2021    HGB 12.5 (L) 05/17/2021    HCT 38.3 05/17/2021    MCV 90.6 05/17/2021     05/17/2021     Lab Results   Component Value Date    GLUCOSE 81 05/17/2021    BUN 9 05/17/2021    CREATININE 0.83 05/17/2021    EGFRIFAFRI 111 05/17/2021    BCR 10.8 05/17/2021    K 4.0 05/17/2021    CO2 28.0 05/17/2021    CALCIUM 9.3 05/17/2021    PROTENTOTREF 8.0 05/17/2021    ALBUMIN 4.30 05/17/2021    ALBUMIN 4.0 05/17/2021    LABIL2 1.1 05/17/2021    AST 17 05/17/2021    ALT 9 05/17/2021        Advance Care Planning   ACP discussion was held with the patient during this visit. Patient does not have an advance directive, declines further assistance.      Assessment/Plan   Jh Bryan is a 70 y.o. year old male with IgG monoclonal gammopathy who returns for follow up.    MGUS: Stable for many years.  M spike stable at 1.9.  The remainder of his labs are stable with the exception of a slight decrease of hemoglobin which is 12.5 g/dL.  I will obtain ferritin, iron and B12 levels today.  I will contact him with results when available.    We will obtain labs again prior to return and see him back in 6 months.      I spent 15 minutes caring for Jh on this date of service. This time includes time spent by me in the following activities: preparing for the visit, reviewing tests, obtaining and/or reviewing a separately obtained history, performing a medically appropriate examination and/or evaluation, ordering medications, tests, or procedures and documenting information in the medical record.         Theodora Paez, APRN    5/20/2021          CC:

## 2021-11-04 ENCOUNTER — LAB (OUTPATIENT)
Dept: LAB | Facility: HOSPITAL | Age: 71
End: 2021-11-04

## 2021-11-04 DIAGNOSIS — D47.2 IGG MONOCLONAL PROTEIN DISORDER: ICD-10-CM

## 2021-11-04 LAB
ERYTHROCYTE [DISTWIDTH] IN BLOOD BY AUTOMATED COUNT: 14.5 % (ref 12.3–15.4)
HCT VFR BLD AUTO: 39.6 % (ref 37.5–51)
HGB BLD-MCNC: 13 G/DL (ref 13–17.7)
LYMPHOCYTES # BLD AUTO: 1.6 10*3/MM3 (ref 0.7–3.1)
LYMPHOCYTES NFR BLD AUTO: 61.7 % (ref 19.6–45.3)
MCH RBC QN AUTO: 30.2 PG (ref 26.6–33)
MCHC RBC AUTO-ENTMCNC: 32.9 G/DL (ref 31.5–35.7)
MCV RBC AUTO: 91.7 FL (ref 79–97)
MONOCYTES # BLD AUTO: 0.3 10*3/MM3 (ref 0.1–0.9)
MONOCYTES NFR BLD AUTO: 9.9 % (ref 5–12)
NEUTROPHILS NFR BLD AUTO: 0.7 10*3/MM3 (ref 1.7–7)
NEUTROPHILS NFR BLD AUTO: 28.4 % (ref 42.7–76)
PLATELET # BLD AUTO: 304 10*3/MM3 (ref 140–450)
PMV BLD AUTO: 6.6 FL (ref 6–12)
RBC # BLD AUTO: 4.32 10*6/MM3 (ref 4.14–5.8)
WBC # BLD AUTO: 2.6 10*3/MM3 (ref 3.4–10.8)

## 2021-11-04 PROCEDURE — 84155 ASSAY OF PROTEIN SERUM: CPT

## 2021-11-04 PROCEDURE — 84165 PROTEIN E-PHORESIS SERUM: CPT

## 2021-11-04 PROCEDURE — 82784 ASSAY IGA/IGD/IGG/IGM EACH: CPT

## 2021-11-04 PROCEDURE — 85025 COMPLETE CBC W/AUTO DIFF WBC: CPT

## 2021-11-04 PROCEDURE — 86334 IMMUNOFIX E-PHORESIS SERUM: CPT

## 2021-11-04 PROCEDURE — 36415 COLL VENOUS BLD VENIPUNCTURE: CPT

## 2021-11-04 PROCEDURE — 83883 ASSAY NEPHELOMETRY NOT SPEC: CPT

## 2021-11-05 LAB
ALBUMIN SERPL ELPH-MCNC: 4.5 G/DL (ref 2.9–4.4)
ALBUMIN/GLOB SERPL: 1.1 {RATIO} (ref 0.7–1.7)
ALPHA1 GLOB SERPL ELPH-MCNC: 0.2 G/DL (ref 0–0.4)
ALPHA2 GLOB SERPL ELPH-MCNC: 0.5 G/DL (ref 0.4–1)
B-GLOBULIN SERPL ELPH-MCNC: 0.8 G/DL (ref 0.7–1.3)
GAMMA GLOB SERPL ELPH-MCNC: 2.7 G/DL (ref 0.4–1.8)
GLOBULIN SER-MCNC: 4.3 G/DL (ref 2.2–3.9)
IGA SERPL-MCNC: 31 MG/DL (ref 61–437)
IGG SERPL-MCNC: 3105 MG/DL (ref 603–1613)
IGM SERPL-MCNC: 27 MG/DL (ref 20–172)
INTERPRETATION SERPL IEP-IMP: ABNORMAL
KAPPA LC FREE SER-MCNC: 8.9 MG/L (ref 3.3–19.4)
KAPPA LC FREE/LAMBDA FREE SER: 0.06 {RATIO} (ref 0.26–1.65)
LABORATORY COMMENT REPORT: ABNORMAL
LAMBDA LC FREE SERPL-MCNC: 147.5 MG/L (ref 5.7–26.3)
M PROTEIN SERPL ELPH-MCNC: 2.4 G/DL
PROT SERPL-MCNC: 8.8 G/DL (ref 6–8.5)

## 2021-11-18 ENCOUNTER — LAB (OUTPATIENT)
Dept: LAB | Facility: HOSPITAL | Age: 71
End: 2021-11-18

## 2021-11-18 ENCOUNTER — OFFICE VISIT (OUTPATIENT)
Dept: ONCOLOGY | Facility: CLINIC | Age: 71
End: 2021-11-18

## 2021-11-18 VITALS
HEIGHT: 72 IN | OXYGEN SATURATION: 100 % | WEIGHT: 150 LBS | BODY MASS INDEX: 20.32 KG/M2 | RESPIRATION RATE: 18 BRPM | SYSTOLIC BLOOD PRESSURE: 151 MMHG | TEMPERATURE: 97.7 F | HEART RATE: 62 BPM | DIASTOLIC BLOOD PRESSURE: 75 MMHG

## 2021-11-18 DIAGNOSIS — D47.2 IGG MONOCLONAL PROTEIN DISORDER: Primary | ICD-10-CM

## 2021-11-18 LAB
ALBUMIN SERPL-MCNC: 4.5 G/DL (ref 3.5–5.2)
ALBUMIN/GLOB SERPL: 1 G/DL
ALP SERPL-CCNC: 70 U/L (ref 39–117)
ALT SERPL W P-5'-P-CCNC: 14 U/L (ref 1–41)
ANION GAP SERPL CALCULATED.3IONS-SCNC: 9 MMOL/L (ref 5–15)
AST SERPL-CCNC: 18 U/L (ref 1–40)
BILIRUB SERPL-MCNC: 0.7 MG/DL (ref 0–1.2)
BUN SERPL-MCNC: 9 MG/DL (ref 8–23)
BUN/CREAT SERPL: 9.1 (ref 7–25)
CALCIUM SPEC-SCNC: 9.7 MG/DL (ref 8.6–10.5)
CHLORIDE SERPL-SCNC: 102 MMOL/L (ref 98–107)
CO2 SERPL-SCNC: 27 MMOL/L (ref 22–29)
CREAT SERPL-MCNC: 0.99 MG/DL (ref 0.76–1.27)
GFR SERPL CREATININE-BSD FRML MDRD: 91 ML/MIN/1.73
GLOBULIN UR ELPH-MCNC: 4.6 GM/DL
GLUCOSE SERPL-MCNC: 93 MG/DL (ref 65–99)
POTASSIUM SERPL-SCNC: 4.1 MMOL/L (ref 3.5–5.2)
PROT SERPL-MCNC: 9.1 G/DL (ref 6–8.5)
SODIUM SERPL-SCNC: 138 MMOL/L (ref 136–145)

## 2021-11-18 PROCEDURE — 80053 COMPREHEN METABOLIC PANEL: CPT

## 2021-11-18 PROCEDURE — 99213 OFFICE O/P EST LOW 20 MIN: CPT | Performed by: INTERNAL MEDICINE

## 2021-11-18 PROCEDURE — 36415 COLL VENOUS BLD VENIPUNCTURE: CPT

## 2021-11-18 NOTE — PROGRESS NOTES
"  PROBLEM LIST:  1. Monoclonal protein diagnosed in 2005 previously followed by Dr. Usama Jones, IgG:   a) Bone marrow biopsy on 03/10/2005 showed a normocellular bone marrow with normal hematopoiesis with no evidence of multiple myeloma or lymphoma, plasma cells 3%.   b) Reevaluation on 09/16/2014 showed a normal CBC, normal calcium, normal kidney function, IgG lambda monoclonal protein of 1.6 g/dL, kappa lambda ratio of 0.15, spot urine immunofixation with IgG lambda.   c) Repeat bone marrow biopsy on 03/01/2016 showed a normocellular marrow with 3 to 5% plasma cells. The plasma cells had a mild relative lambda excess. There was no evidence of amyloid deposition on Congo red stain.  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  2. Chronic pain.   3. Hypertension.    Subjective      CC: MGUS    HISTORY OF PRESENT ILLNESS:   Jh Bryan returns for follow-up.  He is feeling about the same.  His weight has been relatively stable over the past 6 months but he has lost weight over the past few years.  He says his appetite is decreased.  He smokes marijuana regularly, but says he struggles a lot with anxiety.  He continues to work about 4 hours a day.  He has aching in his shoulders bilaterally.      Objective      /75 Comment: MARNI  Pulse 62   Temp 97.7 °F (36.5 °C) (Infrared)   Resp 18   Ht 182.9 cm (72\")   Wt 68 kg (150 lb)   SpO2 100% Comment: RA  BMI 20.34 kg/m²      Performance Status: 1    General: well appearing male in no acute distress  Neuro: alert and oriented  HEENT: sclera anicteric, oropharynx clear  Lymphatics: no cervical, supraclavicular, or axillary adenopathy  Cardiovascular: regular rate and rhythm, no murmurs  Lungs: clear to auscultation bilaterally  Abdomen: soft, nontender, nondistended.  No palpable organomegaly  Extremeties: no lower extremity edema  Skin: no rashes, lesions, bruising, or petechiae  Psych: mood and affect appropriate      Lab Results   Component Value Date    WBC 2.60 " (L) 11/04/2021    HGB 13.0 11/04/2021    HCT 39.6 11/04/2021    MCV 91.7 11/04/2021     11/04/2021     Lab Results   Component Value Date    GLUCOSE 81 05/17/2021    BUN 9 05/17/2021    CREATININE 0.83 05/17/2021    EGFRIFAFRI 111 05/17/2021    BCR 10.8 05/17/2021    K 4.0 05/17/2021    CO2 28.0 05/17/2021    CALCIUM 9.3 05/17/2021    PROTENTOTREF 8.8 (H) 11/04/2021    ALBUMIN 4.5 (H) 11/04/2021    LABIL2 1.1 11/04/2021    AST 17 05/17/2021    ALT 9 05/17/2021        Assessment/Plan   Jh Bryan is a 71 y.o. year old male with IgG monoclonal gammopathy who returns for follow up.    MGUS: Stable for many years.  M spike is 2.5 which is slightly increased, but no other signs of progression to myeloma.   CMP is pending today.  We will contact him with any results that require further intervention.    F/u with labs in 6 months.            Cheri Wong MD    11/18/2021          CC:

## 2022-05-31 DIAGNOSIS — R35.0 URINARY FREQUENCY: ICD-10-CM

## 2022-05-31 RX ORDER — TAMSULOSIN HYDROCHLORIDE 0.4 MG/1
CAPSULE ORAL
Qty: 30 CAPSULE | Refills: 11 | Status: SHIPPED | OUTPATIENT
Start: 2022-05-31

## 2022-11-09 ENCOUNTER — TELEPHONE (OUTPATIENT)
Dept: ONCOLOGY | Facility: CLINIC | Age: 72
End: 2022-11-09

## 2022-11-09 NOTE — TELEPHONE ENCOUNTER
Caller: Jh Bryan    Relationship to patient: Self    Best call back number: [phone]    Chief complaint: PATIENT TO SCHEDULE WITH OFFICE    Type of visit: FU    Requested date: NEXT AVAILABLE

## 2022-11-11 ENCOUNTER — LAB (OUTPATIENT)
Dept: LAB | Facility: HOSPITAL | Age: 72
End: 2022-11-11

## 2022-11-11 DIAGNOSIS — D47.2 IGG MONOCLONAL PROTEIN DISORDER: ICD-10-CM

## 2022-11-11 LAB
ALBUMIN SERPL-MCNC: 4.4 G/DL (ref 3.5–5.2)
ALBUMIN/GLOB SERPL: 1 G/DL
ALP SERPL-CCNC: 57 U/L (ref 39–117)
ALT SERPL W P-5'-P-CCNC: 16 U/L (ref 1–41)
ANION GAP SERPL CALCULATED.3IONS-SCNC: 6 MMOL/L (ref 5–15)
AST SERPL-CCNC: 16 U/L (ref 1–40)
BASOPHILS # BLD AUTO: 0.02 10*3/MM3 (ref 0–0.2)
BASOPHILS NFR BLD AUTO: 1 % (ref 0–1.5)
BILIRUB SERPL-MCNC: 0.4 MG/DL (ref 0–1.2)
BUN SERPL-MCNC: 12 MG/DL (ref 8–23)
BUN/CREAT SERPL: 12 (ref 7–25)
CALCIUM SPEC-SCNC: 9.3 MG/DL (ref 8.6–10.5)
CHLORIDE SERPL-SCNC: 106 MMOL/L (ref 98–107)
CO2 SERPL-SCNC: 29 MMOL/L (ref 22–29)
CREAT SERPL-MCNC: 1 MG/DL (ref 0.76–1.27)
DEPRECATED RDW RBC AUTO: 50.1 FL (ref 37–54)
EGFRCR SERPLBLD CKD-EPI 2021: 80.5 ML/MIN/1.73
EOSINOPHIL # BLD AUTO: 0.2 10*3/MM3 (ref 0–0.4)
EOSINOPHIL NFR BLD AUTO: 9.6 % (ref 0.3–6.2)
ERYTHROCYTE [DISTWIDTH] IN BLOOD BY AUTOMATED COUNT: 13.8 % (ref 12.3–15.4)
GLOBULIN UR ELPH-MCNC: 4.3 GM/DL
GLUCOSE SERPL-MCNC: 87 MG/DL (ref 65–99)
HCT VFR BLD AUTO: 39.1 % (ref 37.5–51)
HGB BLD-MCNC: 12.5 G/DL (ref 13–17.7)
IMM GRANULOCYTES # BLD AUTO: 0 10*3/MM3 (ref 0–0.05)
IMM GRANULOCYTES NFR BLD AUTO: 0 % (ref 0–0.5)
LYMPHOCYTES # BLD AUTO: 1.14 10*3/MM3 (ref 0.7–3.1)
LYMPHOCYTES NFR BLD AUTO: 54.5 % (ref 19.6–45.3)
MCH RBC QN AUTO: 30.9 PG (ref 26.6–33)
MCHC RBC AUTO-ENTMCNC: 32 G/DL (ref 31.5–35.7)
MCV RBC AUTO: 96.5 FL (ref 79–97)
MONOCYTES # BLD AUTO: 0.22 10*3/MM3 (ref 0.1–0.9)
MONOCYTES NFR BLD AUTO: 10.5 % (ref 5–12)
NEUTROPHILS NFR BLD AUTO: 0.51 10*3/MM3 (ref 1.7–7)
NEUTROPHILS NFR BLD AUTO: 24.4 % (ref 42.7–76)
PLATELET # BLD AUTO: 250 10*3/MM3 (ref 140–450)
PMV BLD AUTO: 8.9 FL (ref 6–12)
POTASSIUM SERPL-SCNC: 4.3 MMOL/L (ref 3.5–5.2)
PROT SERPL-MCNC: 8.7 G/DL (ref 6–8.5)
RBC # BLD AUTO: 4.05 10*6/MM3 (ref 4.14–5.8)
SODIUM SERPL-SCNC: 141 MMOL/L (ref 136–145)
WBC NRBC COR # BLD: 2.09 10*3/MM3 (ref 3.4–10.8)

## 2022-11-11 PROCEDURE — 85025 COMPLETE CBC W/AUTO DIFF WBC: CPT

## 2022-11-11 PROCEDURE — 80053 COMPREHEN METABOLIC PANEL: CPT

## 2022-11-11 PROCEDURE — 82784 ASSAY IGA/IGD/IGG/IGM EACH: CPT

## 2022-11-11 PROCEDURE — 83521 IG LIGHT CHAINS FREE EACH: CPT

## 2022-11-11 PROCEDURE — 36415 COLL VENOUS BLD VENIPUNCTURE: CPT

## 2022-11-11 PROCEDURE — 84165 PROTEIN E-PHORESIS SERUM: CPT

## 2022-11-11 PROCEDURE — 86334 IMMUNOFIX E-PHORESIS SERUM: CPT

## 2022-11-14 LAB
ALBUMIN SERPL ELPH-MCNC: 4.3 G/DL (ref 2.9–4.4)
ALBUMIN/GLOB SERPL: 1.2 {RATIO} (ref 0.7–1.7)
ALPHA1 GLOB SERPL ELPH-MCNC: 0.2 G/DL (ref 0–0.4)
ALPHA2 GLOB SERPL ELPH-MCNC: 0.4 G/DL (ref 0.4–1)
B-GLOBULIN SERPL ELPH-MCNC: 0.8 G/DL (ref 0.7–1.3)
GAMMA GLOB SERPL ELPH-MCNC: 2.4 G/DL (ref 0.4–1.8)
GLOBULIN SER-MCNC: 3.9 G/DL (ref 2.2–3.9)
IGA SERPL-MCNC: 30 MG/DL (ref 61–437)
IGG SERPL-MCNC: 2956 MG/DL (ref 603–1613)
IGM SERPL-MCNC: 21 MG/DL (ref 15–143)
INTERPRETATION SERPL IEP-IMP: ABNORMAL
LABORATORY COMMENT REPORT: ABNORMAL
M PROTEIN SERPL ELPH-MCNC: 2.2 G/DL
PROT SERPL-MCNC: 8.2 G/DL (ref 6–8.5)

## 2022-11-15 LAB
KAPPA LC FREE SER-MCNC: 10.5 MG/L (ref 3.3–19.4)
KAPPA LC FREE/LAMBDA FREE SER: 0.08 {RATIO} (ref 0.26–1.65)
LAMBDA LC FREE SERPL-MCNC: 129.1 MG/L (ref 5.7–26.3)

## 2022-11-18 ENCOUNTER — OFFICE VISIT (OUTPATIENT)
Dept: ONCOLOGY | Facility: CLINIC | Age: 72
End: 2022-11-18

## 2022-11-18 ENCOUNTER — TELEPHONE (OUTPATIENT)
Dept: ONCOLOGY | Facility: CLINIC | Age: 72
End: 2022-11-18

## 2022-11-18 ENCOUNTER — LAB (OUTPATIENT)
Dept: LAB | Facility: HOSPITAL | Age: 72
End: 2022-11-18

## 2022-11-18 VITALS
OXYGEN SATURATION: 98 % | BODY MASS INDEX: 20.86 KG/M2 | WEIGHT: 154 LBS | DIASTOLIC BLOOD PRESSURE: 75 MMHG | RESPIRATION RATE: 18 BRPM | TEMPERATURE: 98.1 F | SYSTOLIC BLOOD PRESSURE: 134 MMHG | HEART RATE: 76 BPM | HEIGHT: 72 IN

## 2022-11-18 DIAGNOSIS — D47.2 SMOLDERING MULTIPLE MYELOMA (SMM): ICD-10-CM

## 2022-11-18 DIAGNOSIS — R30.0 DYSURIA: ICD-10-CM

## 2022-11-18 DIAGNOSIS — D47.2 IGG MONOCLONAL PROTEIN DISORDER: Primary | ICD-10-CM

## 2022-11-18 DIAGNOSIS — R77.9 ABNORMALITY OF PLASMA PROTEIN, UNSPECIFIED: ICD-10-CM

## 2022-11-18 LAB
BILIRUB UR QL STRIP: NEGATIVE
CLARITY UR: CLEAR
COLOR UR: YELLOW
GLUCOSE UR STRIP-MCNC: NEGATIVE MG/DL
HGB UR QL STRIP.AUTO: NEGATIVE
KETONES UR QL STRIP: NEGATIVE
LEUKOCYTE ESTERASE UR QL STRIP.AUTO: NEGATIVE
NITRITE UR QL STRIP: NEGATIVE
PH UR STRIP.AUTO: 5.5 [PH] (ref 5–8)
PROT UR QL STRIP: NEGATIVE
SP GR UR STRIP: 1.02 (ref 1–1.03)
UROBILINOGEN UR QL STRIP: NORMAL

## 2022-11-18 PROCEDURE — 99214 OFFICE O/P EST MOD 30 MIN: CPT | Performed by: NURSE PRACTITIONER

## 2022-11-18 PROCEDURE — 81003 URINALYSIS AUTO W/O SCOPE: CPT

## 2022-11-18 RX ORDER — SERTRALINE HYDROCHLORIDE 25 MG/1
25 TABLET, FILM COATED ORAL DAILY
COMMUNITY
Start: 2022-10-27

## 2022-11-18 NOTE — PROGRESS NOTES
"  PROBLEM LIST:  1. Monoclonal protein diagnosed in 2005 previously followed by Dr. Usama Jones, IgG:   a) Bone marrow biopsy on 03/10/2005 showed a normocellular bone marrow with normal hematopoiesis with no evidence of multiple myeloma or lymphoma, plasma cells 3%.   b) Reevaluation on 09/16/2014 showed a normal CBC, normal calcium, normal kidney function, IgG lambda monoclonal protein of 1.6 g/dL, kappa lambda ratio of 0.15, spot urine immunofixation with IgG lambda.   c) Repeat bone marrow biopsy on 03/01/2016 showed a normocellular marrow with 3 to 5% plasma cells. The plasma cells had a mild relative lambda excess. There was no evidence of amyloid deposition on Congo red stain.  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  2. Chronic pain.   3. Hypertension.    Subjective      CC: MGUS    HISTORY OF PRESENT ILLNESS:   Jh Bryan returns for follow-up.  Over the past few months he has been hurting all over in his bones.  He says he feels bad overall.  He continues to work part-time but comes home and is completely fatigued.  He complains of dysuria recently.        Objective      /75   Pulse 76   Temp 98.1 °F (36.7 °C)   Resp 18   Ht 182.9 cm (72\")   Wt 69.9 kg (154 lb)   SpO2 98%   BMI 20.89 kg/m²    Vitals:    11/18/22 0904   PainSc: 0-No pain  Comment: No new pain. +fatigue. Old prostate pain = 7             Performance Status: 1    General: well appearing male in no acute distress  Neuro: alert and oriented  HEENT: sclera anicteric, oropharynx clear  Lymphatics: no cervical, supraclavicular, or axillary adenopathy  Cardiovascular: regular rate and rhythm, no murmurs  Lungs: clear to auscultation bilaterally  Abdomen: soft, nontender, nondistended.  No palpable organomegaly  Extremeties: no lower extremity edema  Skin: no rashes, lesions, bruising, or petechiae  Psych: mood and affect appropriate      Lab Results   Component Value Date    WBC 2.09 (L) 11/11/2022    HGB 12.5 (L) 11/11/2022    HCT " 39.1 11/11/2022    MCV 96.5 11/11/2022     11/11/2022     Lab Results   Component Value Date    GLUCOSE 87 11/11/2022    BUN 12 11/11/2022    CREATININE 1.00 11/11/2022    EGFRIFAFRI 91 11/18/2021    BCR 12.0 11/11/2022    K 4.3 11/11/2022    CO2 29.0 11/11/2022    CALCIUM 9.3 11/11/2022    PROTENTOTREF 8.2 11/11/2022    ALBUMIN 4.40 11/11/2022    ALBUMIN 4.3 11/11/2022    LABIL2 1.2 11/11/2022    AST 16 11/11/2022    ALT 16 11/11/2022        Assessment & Plan   Jh Bryan is a 72 y.o. year old male with IgG monoclonal gammopathy who returns for follow up.    MGUS: Stable for many years.  M spike on 11/11/2022 was 2.2.  This is slightly decreased to last M spike.  He does have increased bone pain that is generalized.  I will obtain a PET scan for evaluation of progression to myeloma.  If insurance will not allow us to do a PET scan I will do a full body CT scan.  His hemoglobin from labs on 11/11/2022 was slightly decreased at 12.5 g/dL.  No other abnormalities noted.  I will contact him with results of PET scan when available.    Dysuria: Obtain urinalysis today.    F/u with labs in 6 months.            Theodora Paez, APRN    11/18/2022          CC:

## 2022-11-18 NOTE — TELEPHONE ENCOUNTER
I called patient per JONATAN Montemayor to let him know that the UA was stable. Patient verbalized understanding.

## 2022-12-02 ENCOUNTER — HOSPITAL ENCOUNTER (OUTPATIENT)
Dept: PET IMAGING | Facility: HOSPITAL | Age: 72
End: 2022-12-02

## 2022-12-16 ENCOUNTER — HOSPITAL ENCOUNTER (OUTPATIENT)
Dept: PET IMAGING | Facility: HOSPITAL | Age: 72
Discharge: HOME | End: 2022-12-16

## 2022-12-16 DIAGNOSIS — R77.9 ABNORMALITY OF PLASMA PROTEIN, UNSPECIFIED: ICD-10-CM

## 2022-12-16 DIAGNOSIS — D47.2 IGG MONOCLONAL PROTEIN DISORDER: ICD-10-CM

## 2022-12-16 LAB — GLUCOSE BLDC GLUCOMTR-MCNC: 102 MG/DL (ref 70–130)

## 2022-12-16 PROCEDURE — 82962 GLUCOSE BLOOD TEST: CPT

## 2022-12-16 PROCEDURE — A9552 F18 FDG: HCPCS | Performed by: NURSE PRACTITIONER

## 2022-12-16 PROCEDURE — 78816 PET IMAGE W/CT FULL BODY: CPT

## 2022-12-16 PROCEDURE — 0 FLUDEOXYGLUCOSE F18 SOLUTION: Performed by: NURSE PRACTITIONER

## 2022-12-16 RX ADMIN — FLUDEOXYGLUCOSE F18 1 DOSE: 300 INJECTION INTRAVENOUS at 10:02

## 2023-05-19 ENCOUNTER — LAB (OUTPATIENT)
Dept: LAB | Facility: HOSPITAL | Age: 73
End: 2023-05-19
Payer: MEDICARE

## 2023-05-19 DIAGNOSIS — D47.2 IGG MONOCLONAL PROTEIN DISORDER: ICD-10-CM

## 2023-05-19 LAB
ALBUMIN SERPL-MCNC: 4.5 G/DL (ref 3.5–5.2)
ALBUMIN/GLOB SERPL: 1.1 G/DL
ALP SERPL-CCNC: 60 U/L (ref 39–117)
ALT SERPL W P-5'-P-CCNC: 13 U/L (ref 1–41)
ANION GAP SERPL CALCULATED.3IONS-SCNC: 6 MMOL/L (ref 5–15)
AST SERPL-CCNC: 18 U/L (ref 1–40)
BASOPHILS # BLD AUTO: 0.01 10*3/MM3 (ref 0–0.2)
BASOPHILS NFR BLD AUTO: 0.6 % (ref 0–1.5)
BILIRUB SERPL-MCNC: 0.5 MG/DL (ref 0–1.2)
BUN SERPL-MCNC: 12 MG/DL (ref 8–23)
BUN/CREAT SERPL: 12.4 (ref 7–25)
CALCIUM SPEC-SCNC: 8.4 MG/DL (ref 8.6–10.5)
CHLORIDE SERPL-SCNC: 104 MMOL/L (ref 98–107)
CO2 SERPL-SCNC: 29 MMOL/L (ref 22–29)
CREAT SERPL-MCNC: 0.97 MG/DL (ref 0.76–1.27)
DEPRECATED RDW RBC AUTO: 48.2 FL (ref 37–54)
EGFRCR SERPLBLD CKD-EPI 2021: 83.5 ML/MIN/1.73
EOSINOPHIL # BLD AUTO: 0.16 10*3/MM3 (ref 0–0.4)
EOSINOPHIL NFR BLD AUTO: 9.8 % (ref 0.3–6.2)
ERYTHROCYTE [DISTWIDTH] IN BLOOD BY AUTOMATED COUNT: 13.6 % (ref 12.3–15.4)
GLOBULIN UR ELPH-MCNC: 4.2 GM/DL
GLUCOSE SERPL-MCNC: 90 MG/DL (ref 65–99)
HCT VFR BLD AUTO: 40.6 % (ref 37.5–51)
HGB BLD-MCNC: 13.2 G/DL (ref 13–17.7)
IMM GRANULOCYTES # BLD AUTO: 0 10*3/MM3 (ref 0–0.05)
IMM GRANULOCYTES NFR BLD AUTO: 0 % (ref 0–0.5)
LYMPHOCYTES # BLD AUTO: 0.97 10*3/MM3 (ref 0.7–3.1)
LYMPHOCYTES NFR BLD AUTO: 59.1 % (ref 19.6–45.3)
MCH RBC QN AUTO: 30.8 PG (ref 26.6–33)
MCHC RBC AUTO-ENTMCNC: 32.5 G/DL (ref 31.5–35.7)
MCV RBC AUTO: 94.6 FL (ref 79–97)
MONOCYTES # BLD AUTO: 0.15 10*3/MM3 (ref 0.1–0.9)
MONOCYTES NFR BLD AUTO: 9.1 % (ref 5–12)
NEUTROPHILS NFR BLD AUTO: 0.35 10*3/MM3 (ref 1.7–7)
NEUTROPHILS NFR BLD AUTO: 21.4 % (ref 42.7–76)
PLATELET # BLD AUTO: 251 10*3/MM3 (ref 140–450)
PMV BLD AUTO: 8.8 FL (ref 6–12)
POTASSIUM SERPL-SCNC: 4.1 MMOL/L (ref 3.5–5.2)
PROT SERPL-MCNC: 8.7 G/DL (ref 6–8.5)
RBC # BLD AUTO: 4.29 10*6/MM3 (ref 4.14–5.8)
SODIUM SERPL-SCNC: 139 MMOL/L (ref 136–145)
WBC NRBC COR # BLD: 1.64 10*3/MM3 (ref 3.4–10.8)

## 2023-05-19 PROCEDURE — 36415 COLL VENOUS BLD VENIPUNCTURE: CPT

## 2023-05-19 PROCEDURE — 80053 COMPREHEN METABOLIC PANEL: CPT

## 2023-05-19 PROCEDURE — 83521 IG LIGHT CHAINS FREE EACH: CPT

## 2023-05-19 PROCEDURE — 84165 PROTEIN E-PHORESIS SERUM: CPT

## 2023-05-19 PROCEDURE — 85025 COMPLETE CBC W/AUTO DIFF WBC: CPT

## 2023-05-19 PROCEDURE — 86334 IMMUNOFIX E-PHORESIS SERUM: CPT

## 2023-05-19 PROCEDURE — 82784 ASSAY IGA/IGD/IGG/IGM EACH: CPT

## 2023-05-22 LAB
ALBUMIN SERPL ELPH-MCNC: 4.2 G/DL (ref 2.9–4.4)
ALBUMIN/GLOB SERPL: 1.1 {RATIO} (ref 0.7–1.7)
ALPHA1 GLOB SERPL ELPH-MCNC: 0.2 G/DL (ref 0–0.4)
ALPHA2 GLOB SERPL ELPH-MCNC: 0.5 G/DL (ref 0.4–1)
B-GLOBULIN SERPL ELPH-MCNC: 0.8 G/DL (ref 0.7–1.3)
GAMMA GLOB SERPL ELPH-MCNC: 2.4 G/DL (ref 0.4–1.8)
GLOBULIN SER-MCNC: 4 G/DL (ref 2.2–3.9)
IGA SERPL-MCNC: 28 MG/DL (ref 61–437)
IGG SERPL-MCNC: 2982 MG/DL (ref 603–1613)
IGM SERPL-MCNC: 20 MG/DL (ref 15–143)
INTERPRETATION SERPL IEP-IMP: ABNORMAL
KAPPA LC FREE SER-MCNC: 7.7 MG/L (ref 3.3–19.4)
KAPPA LC FREE/LAMBDA FREE SER: 0.06 {RATIO} (ref 0.26–1.65)
LABORATORY COMMENT REPORT: ABNORMAL
LAMBDA LC FREE SERPL-MCNC: 131.9 MG/L (ref 5.7–26.3)
M PROTEIN SERPL ELPH-MCNC: 2.1 G/DL
PROT SERPL-MCNC: 8.2 G/DL (ref 6–8.5)

## 2023-05-31 ENCOUNTER — OFFICE VISIT (OUTPATIENT)
Dept: ONCOLOGY | Facility: CLINIC | Age: 73
End: 2023-05-31

## 2023-05-31 VITALS
RESPIRATION RATE: 16 BRPM | HEART RATE: 75 BPM | WEIGHT: 148 LBS | BODY MASS INDEX: 20.05 KG/M2 | TEMPERATURE: 96.9 F | DIASTOLIC BLOOD PRESSURE: 65 MMHG | HEIGHT: 72 IN | OXYGEN SATURATION: 99 % | SYSTOLIC BLOOD PRESSURE: 142 MMHG

## 2023-05-31 DIAGNOSIS — R53.82 CHRONIC FATIGUE: Primary | ICD-10-CM

## 2023-05-31 DIAGNOSIS — D47.2 IGG MONOCLONAL PROTEIN DISORDER: ICD-10-CM

## 2023-05-31 LAB
BASOPHILS # BLD AUTO: 0.03 10*3/MM3 (ref 0–0.2)
BASOPHILS NFR BLD AUTO: 1.6 % (ref 0–1.5)
DEPRECATED RDW RBC AUTO: 49.4 FL (ref 37–54)
EOSINOPHIL # BLD AUTO: 0.17 10*3/MM3 (ref 0–0.4)
EOSINOPHIL NFR BLD AUTO: 9.3 % (ref 0.3–6.2)
ERYTHROCYTE [DISTWIDTH] IN BLOOD BY AUTOMATED COUNT: 13.6 % (ref 12.3–15.4)
HCT VFR BLD AUTO: 42.7 % (ref 37.5–51)
HGB BLD-MCNC: 13.4 G/DL (ref 13–17.7)
IMM GRANULOCYTES # BLD AUTO: 0 10*3/MM3 (ref 0–0.05)
IMM GRANULOCYTES NFR BLD AUTO: 0 % (ref 0–0.5)
LYMPHOCYTES # BLD AUTO: 0.98 10*3/MM3 (ref 0.7–3.1)
LYMPHOCYTES NFR BLD AUTO: 53.8 % (ref 19.6–45.3)
MCH RBC QN AUTO: 30.5 PG (ref 26.6–33)
MCHC RBC AUTO-ENTMCNC: 31.4 G/DL (ref 31.5–35.7)
MCV RBC AUTO: 97 FL (ref 79–97)
MONOCYTES # BLD AUTO: 0.19 10*3/MM3 (ref 0.1–0.9)
MONOCYTES NFR BLD AUTO: 10.4 % (ref 5–12)
NEUTROPHILS NFR BLD AUTO: 0.45 10*3/MM3 (ref 1.7–7)
NEUTROPHILS NFR BLD AUTO: 24.9 % (ref 42.7–76)
PLATELET # BLD AUTO: 227 10*3/MM3 (ref 140–450)
PMV BLD AUTO: 8.9 FL (ref 6–12)
RBC # BLD AUTO: 4.4 10*6/MM3 (ref 4.14–5.8)
TSH SERPL DL<=0.05 MIU/L-ACNC: 2.96 UIU/ML (ref 0.27–4.2)
VIT B12 BLD-MCNC: 507 PG/ML (ref 211–946)
WBC NRBC COR # BLD: 1.82 10*3/MM3 (ref 3.4–10.8)

## 2023-05-31 PROCEDURE — 3077F SYST BP >= 140 MM HG: CPT | Performed by: INTERNAL MEDICINE

## 2023-05-31 PROCEDURE — 99214 OFFICE O/P EST MOD 30 MIN: CPT | Performed by: INTERNAL MEDICINE

## 2023-05-31 PROCEDURE — 84443 ASSAY THYROID STIM HORMONE: CPT | Performed by: INTERNAL MEDICINE

## 2023-05-31 PROCEDURE — 3078F DIAST BP <80 MM HG: CPT | Performed by: INTERNAL MEDICINE

## 2023-05-31 PROCEDURE — 1126F AMNT PAIN NOTED NONE PRSNT: CPT | Performed by: INTERNAL MEDICINE

## 2023-05-31 PROCEDURE — 36415 COLL VENOUS BLD VENIPUNCTURE: CPT | Performed by: INTERNAL MEDICINE

## 2023-05-31 PROCEDURE — 82607 VITAMIN B-12: CPT | Performed by: INTERNAL MEDICINE

## 2023-05-31 PROCEDURE — 85025 COMPLETE CBC W/AUTO DIFF WBC: CPT | Performed by: INTERNAL MEDICINE

## 2023-05-31 RX ORDER — SERTRALINE HYDROCHLORIDE 25 MG/1
1 TABLET, FILM COATED ORAL DAILY
COMMUNITY
Start: 2023-04-13 | End: 2023-05-31 | Stop reason: SDUPTHER

## 2023-05-31 NOTE — PROGRESS NOTES
"  PROBLEM LIST:  1. Monoclonal protein diagnosed in 2005 previously followed by Dr. Usama Jones, IgG:   a) Bone marrow biopsy on 03/10/2005 showed a normocellular bone marrow with normal hematopoiesis with no evidence of multiple myeloma or lymphoma, plasma cells 3%.   b) Reevaluation on 09/16/2014 showed a normal CBC, normal calcium, normal kidney function, IgG lambda monoclonal protein of 1.6 g/dL, kappa lambda ratio of 0.15, spot urine immunofixation with IgG lambda.   c) Repeat bone marrow biopsy on 03/01/2016 showed a normocellular marrow with 3 to 5% plasma cells. The plasma cells had a mild relative lambda excess. There was no evidence of amyloid deposition on Congo red stain.  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  2. Chronic pain.   3. Hypertension.    Subjective      CC: MGUS    HISTORY OF PRESENT ILLNESS:   Jh Bryan returns for follow-up.   He says he has been feeling really tired.  He has pain in both shoulders, and also some cramps in his legs, left > right.      He has had some increased stress - his wife has asked him to move out and he is looking for an apartment.  He continues to work part time at [employer].      Objective      /65   Pulse 75   Temp 96.9 °F (36.1 °C) (Infrared)   Resp 16   Ht 182.9 cm (72.01\")   Wt 67.1 kg (148 lb)   SpO2 99%   BMI 20.07 kg/m²    Vitals:    05/31/23 0934   PainSc: 0-No pain             Performance Status: 1    General: well appearing male in no acute distress  Neuro: alert and oriented  HEENT: sclera anicteric, oropharynx clear  Lymphatics: no cervical, supraclavicular, or axillary adenopathy  Cardiovascular: regular rate and rhythm, no murmurs  Lungs: clear to auscultation bilaterally  Abdomen: soft, nontender, nondistended.  No palpable organomegaly  Extremeties: no lower extremity edema  Skin: no rashes, lesions, bruising, or petechiae  Psych: mood and affect appropriate    I have reexamined the patient and the results are consistent with the " previously documented exam. Cheri Wong MD          Lab Results   Component Value Date    WBC 1.64 (L) 05/19/2023    HGB 13.2 05/19/2023    HCT 40.6 05/19/2023    MCV 94.6 05/19/2023     05/19/2023     Lab Results   Component Value Date    GLUCOSE 90 05/19/2023    BUN 12 05/19/2023    CREATININE 0.97 05/19/2023    EGFRIFAFRI 91 11/18/2021    BCR 12.4 05/19/2023    K 4.1 05/19/2023    CO2 29.0 05/19/2023    CALCIUM 8.4 (L) 05/19/2023    PROTENTOTREF 8.2 05/19/2023    ALBUMIN 4.5 05/19/2023    ALBUMIN 4.2 05/19/2023    LABIL2 1.1 05/19/2023    AST 18 05/19/2023    ALT 13 05/19/2023        Assessment & Plan   Jh Bryan is a 72 y.o. year old male with IgG monoclonal gammopathy who returns for follow up.    MGUS: Stable for many years.  His last M spike was 2.1, stable compared to 6 months ago.  No myeloma defining events and labs or based on symptoms.    Neutropenia: This is chronic but has been worsening over the past year or so.  I will recheck labs today, along with B12 and folate.  If neutropenia remains severe we will plan to do a bone marrow biopsy.    Fatigue: Check B12 and TSH.    Follow-up in 6 months unless indicated sooner based on above testing.          Cheri Wong MD    5/31/2023          CC:

## 2023-06-01 ENCOUNTER — TELEPHONE (OUTPATIENT)
Dept: ONCOLOGY | Facility: CLINIC | Age: 73
End: 2023-06-01

## 2023-06-01 DIAGNOSIS — D47.2 IGG MONOCLONAL PROTEIN DISORDER: ICD-10-CM

## 2023-06-01 DIAGNOSIS — R53.82 CHRONIC FATIGUE: Primary | ICD-10-CM

## 2023-06-01 DIAGNOSIS — D70.8 OTHER NEUTROPENIA: ICD-10-CM

## 2023-06-01 NOTE — TELEPHONE ENCOUNTER
Per Dr. Wong, I called patient to tell him that his wbc was still low and Dr. wong wants to move forward with doing a bone marrow biopsy.  I told patient that he would get a call from scheduling to set up time and date and then we would schedule a followup with manohar Wong.

## 2023-06-15 ENCOUNTER — HOSPITAL ENCOUNTER (OUTPATIENT)
Dept: CT IMAGING | Facility: HOSPITAL | Age: 73
Discharge: HOME | End: 2023-06-15
Payer: MEDICARE

## 2023-06-15 VITALS
RESPIRATION RATE: 16 BRPM | OXYGEN SATURATION: 99 % | HEIGHT: 72 IN | TEMPERATURE: 98 F | SYSTOLIC BLOOD PRESSURE: 144 MMHG | BODY MASS INDEX: 19.64 KG/M2 | HEART RATE: 61 BPM | DIASTOLIC BLOOD PRESSURE: 87 MMHG | WEIGHT: 145 LBS

## 2023-06-15 DIAGNOSIS — R53.82 CHRONIC FATIGUE: ICD-10-CM

## 2023-06-15 DIAGNOSIS — D47.2 IGG MONOCLONAL PROTEIN DISORDER: ICD-10-CM

## 2023-06-15 DIAGNOSIS — D70.8 OTHER NEUTROPENIA: ICD-10-CM

## 2023-06-15 LAB
ANION GAP SERPL CALCULATED.3IONS-SCNC: 8 MMOL/L (ref 5–15)
BASOPHILS # BLD AUTO: 0.02 10*3/MM3 (ref 0–0.2)
BASOPHILS NFR BLD AUTO: 1.1 % (ref 0–1.5)
BUN SERPL-MCNC: 11 MG/DL (ref 8–23)
BUN/CREAT SERPL: 12 (ref 7–25)
CALCIUM SPEC-SCNC: 9.4 MG/DL (ref 8.6–10.5)
CHLORIDE SERPL-SCNC: 106 MMOL/L (ref 98–107)
CO2 SERPL-SCNC: 26 MMOL/L (ref 22–29)
CREAT SERPL-MCNC: 0.92 MG/DL (ref 0.76–1.27)
DEPRECATED RDW RBC AUTO: 46.7 FL (ref 37–54)
EGFRCR SERPLBLD CKD-EPI 2021: 88.9 ML/MIN/1.73
EOSINOPHIL # BLD AUTO: 0.11 10*3/MM3 (ref 0–0.4)
EOSINOPHIL NFR BLD AUTO: 5.8 % (ref 0.3–6.2)
ERYTHROCYTE [DISTWIDTH] IN BLOOD BY AUTOMATED COUNT: 13.4 % (ref 12.3–15.4)
GLUCOSE SERPL-MCNC: 74 MG/DL (ref 65–99)
HCT VFR BLD AUTO: 41.4 % (ref 37.5–51)
HGB BLD-MCNC: 13.4 G/DL (ref 13–17.7)
IMM GRANULOCYTES # BLD AUTO: 0 10*3/MM3 (ref 0–0.05)
IMM GRANULOCYTES NFR BLD AUTO: 0 % (ref 0–0.5)
INR PPP: 1.04 (ref 0.89–1.12)
LYMPHOCYTES # BLD AUTO: 0.97 10*3/MM3 (ref 0.7–3.1)
LYMPHOCYTES NFR BLD AUTO: 51.3 % (ref 19.6–45.3)
MCH RBC QN AUTO: 30.6 PG (ref 26.6–33)
MCHC RBC AUTO-ENTMCNC: 32.4 G/DL (ref 31.5–35.7)
MCV RBC AUTO: 94.5 FL (ref 79–97)
MONOCYTES # BLD AUTO: 0.23 10*3/MM3 (ref 0.1–0.9)
MONOCYTES NFR BLD AUTO: 12.2 % (ref 5–12)
NEUTROPHILS NFR BLD AUTO: 0.56 10*3/MM3 (ref 1.7–7)
NEUTROPHILS NFR BLD AUTO: 29.6 % (ref 42.7–76)
NRBC BLD AUTO-RTO: 0 /100 WBC (ref 0–0.2)
PLATELET # BLD AUTO: 262 10*3/MM3 (ref 140–450)
PMV BLD AUTO: 9.3 FL (ref 6–12)
POTASSIUM SERPL-SCNC: 4.4 MMOL/L (ref 3.5–5.2)
PROTHROMBIN TIME: 13.7 SECONDS (ref 12.2–14.5)
RBC # BLD AUTO: 4.38 10*6/MM3 (ref 4.14–5.8)
SODIUM SERPL-SCNC: 140 MMOL/L (ref 136–145)
WBC NRBC COR # BLD: 1.89 10*3/MM3 (ref 3.4–10.8)

## 2023-06-15 PROCEDURE — 0 LIDOCAINE 1 % SOLUTION: Performed by: RADIOLOGY

## 2023-06-15 PROCEDURE — 85025 COMPLETE CBC W/AUTO DIFF WBC: CPT | Performed by: RADIOLOGY

## 2023-06-15 PROCEDURE — 99152 MOD SED SAME PHYS/QHP 5/>YRS: CPT

## 2023-06-15 PROCEDURE — 63710000001 HYDROCODONE-ACETAMINOPHEN 5-325 MG TABLET: Performed by: RADIOLOGY

## 2023-06-15 PROCEDURE — A9270 NON-COVERED ITEM OR SERVICE: HCPCS | Performed by: RADIOLOGY

## 2023-06-15 PROCEDURE — 80048 BASIC METABOLIC PNL TOTAL CA: CPT | Performed by: RADIOLOGY

## 2023-06-15 PROCEDURE — 77012 CT SCAN FOR NEEDLE BIOPSY: CPT

## 2023-06-15 PROCEDURE — 25010000002 MIDAZOLAM PER 1 MG: Performed by: RADIOLOGY

## 2023-06-15 PROCEDURE — 85610 PROTHROMBIN TIME: CPT | Performed by: RADIOLOGY

## 2023-06-15 PROCEDURE — 25010000002 FENTANYL CITRATE (PF) 50 MCG/ML SOLUTION: Performed by: RADIOLOGY

## 2023-06-15 RX ORDER — MIDAZOLAM HYDROCHLORIDE 1 MG/ML
INJECTION INTRAMUSCULAR; INTRAVENOUS
Status: DISCONTINUED
Start: 2023-06-15 | End: 2023-06-16 | Stop reason: HOSPADM

## 2023-06-15 RX ORDER — FENTANYL CITRATE 50 UG/ML
INJECTION, SOLUTION INTRAMUSCULAR; INTRAVENOUS
Status: DISCONTINUED
Start: 2023-06-15 | End: 2023-06-16 | Stop reason: HOSPADM

## 2023-06-15 RX ORDER — FENTANYL CITRATE 50 UG/ML
INJECTION, SOLUTION INTRAMUSCULAR; INTRAVENOUS AS NEEDED
Status: COMPLETED | OUTPATIENT
Start: 2023-06-15 | End: 2023-06-15

## 2023-06-15 RX ORDER — LIDOCAINE HYDROCHLORIDE 10 MG/ML
10 INJECTION, SOLUTION INFILTRATION; PERINEURAL ONCE
Status: COMPLETED | OUTPATIENT
Start: 2023-06-15 | End: 2023-06-15

## 2023-06-15 RX ORDER — MIDAZOLAM HYDROCHLORIDE 1 MG/ML
INJECTION INTRAMUSCULAR; INTRAVENOUS AS NEEDED
Status: COMPLETED | OUTPATIENT
Start: 2023-06-15 | End: 2023-06-15

## 2023-06-15 RX ORDER — HYDROCODONE BITARTRATE AND ACETAMINOPHEN 5; 325 MG/1; MG/1
1 TABLET ORAL EVERY 4 HOURS PRN
Status: DISCONTINUED | OUTPATIENT
Start: 2023-06-15 | End: 2023-06-16 | Stop reason: HOSPADM

## 2023-06-15 RX ADMIN — MIDAZOLAM HYDROCHLORIDE 1 MG: 1 INJECTION, SOLUTION INTRAMUSCULAR; INTRAVENOUS at 13:19

## 2023-06-15 RX ADMIN — LIDOCAINE HYDROCHLORIDE 10 ML: 10 INJECTION, SOLUTION INFILTRATION; PERINEURAL at 13:31

## 2023-06-15 RX ADMIN — FENTANYL CITRATE 50 MCG: 50 INJECTION, SOLUTION INTRAMUSCULAR; INTRAVENOUS at 13:19

## 2023-06-15 RX ADMIN — HYDROCODONE BITARTRATE AND ACETAMINOPHEN 1 TABLET: 5; 325 TABLET ORAL at 14:07

## 2023-06-15 NOTE — POST-PROCEDURE NOTE
The following procedure was performed: BMBx    Please see corresponding Radiology report for in detail procedural information. The Radiology report will be dictated shortly, if not done so already. Please see the IR RN note for the information regarding medicines administered if any, jonelle-procedural vitals and I/O information.

## 2023-06-15 NOTE — PRE-PROCEDURE NOTE
"[facility]   Vascular Interventional Radiology  History & Physicial    Pertinent information including components of history, physical examination, review of systems, lab and imaging data, and impression and plan from this source was also reviewed for the creation of the following pre-procedural note: Progress Notes by Cheri Wong MD (2023 10:00)         Patient Name:Jh Bryan    : 1950    MRN: [id number]    Primary Care Physician: Toby Stout DO    Referring Physician: Cheri Wong MD     Date of admission: 6/15/2023    Subjective     Reason for Consult: BMBx    History of Present Illness     Jh Bryan is a 72 y.o. male referred to IR as noted above.      Active Hospital Problems:  There are no active hospital problems to display for this patient.      Personal History     History reviewed. No pertinent past medical history.    Past Surgical History:   Procedure Laterality Date   • BONE MARROW BIOPSY  03/10/2005    Showed normocellular bone marrow with normal hematpoiesis.   • PACEMAKER IMPLANTATION Left 2021   • TOOTH EXTRACTION Right 2016    bottom right tooth 2 or 3rd from the middle to the right       Family History: His family history is not on file.     Social History: He  reports that he has quit smoking. He has never used smokeless tobacco. He reports current drug use. Drug: Marijuana. He reports that he does not drink alcohol.    Home Medications:  sertraline and tamsulosin    Current Medications:  No current facility-administered medications for this encounter.     Allergies:  He has No Known Allergies.    Review of Systems    IR Procedure pertinent significant findings are mentioned in the PMH and PSH above.    Objective     Visit Vitals  /80 (BP Location: Right arm, Patient Position: Lying)   Pulse 59   Temp 98 °F (36.7 °C)   Resp 14   Ht 182.9 cm (72\")   Wt 65.8 kg (145 lb)   SpO2 99%   BMI 19.67 kg/m²        Physical Exam    A&Ox3.   Able to " communicate  No Apparent Distress  Average physique   CVS: Regular rate and rhythm  Respiratory: Non labored breathing. No signs of respiratory compromise.    Result Review      I have personally reviewed the results from the time of this admission to 6/15/2023 13:41 EDT and agree with these findings.  [x]  Laboratory  []  Microbiology  [x]  Radiology  []  EKG/Telemetry   []  Cardiology/Vascular   []  Pathology  []  Old records  []  Other:    Most notable findings include: As noted:    Results from last 7 days   Lab Units 06/15/23  1119   INR  1.04   HEMOGLOBIN g/dL 13.4   HEMATOCRIT % 41.4   PLATELETS 10*3/mm3 262       Estimated Creatinine Clearance: 68.5 mL/min (by C-G formula based on SCr of 0.92 mg/dL).   Creatinine   Date Value Ref Range Status   06/15/2023 0.92 0.76 - 1.27 mg/dL Final       No results found for: COVID19     No results found for: PREGTESTUR, PREGSERUM, HCG, HCGQUANT     ASA SCALE ASSESSMENT (applicable ONLY if sedation planned):   1     MALLAMPATI CLASSIFICATION (applicable ONLY if sedation planned):   1    Assessment / Plan     Jh Bryan is a 72 y.o. male referred to the IR service with above problem.    Plan:   As above.    Notice: The note was created before the performance of the procedure. It might have been left in the pending status and signed off after the procedure. The time stamp on the note may be misleading.    Imtiaz Hill MD   Vascular Interventional Radiology  06/15/23   1:41 PM EDT

## 2023-06-15 NOTE — NURSING NOTE
CT guided bone marrow biopsy performed by Dr Hill. Sample obtained, labeled, and taken to lab per CT tech. Patient tolerated well. 1 MG Versed and 50 MCG Fentanyl given during the procedure for a sedation time of 11 minutes. Report called to ROSA Thompson   DLP -  137

## 2023-06-15 NOTE — DISCHARGE INSTRUCTIONS
Avoid any strenuous activities, pulling, tugging, straining or lifting anything over 10 pounds for the next 24 hours.    You may remove the bandaid (if you have one) tomorrow and shower tomorrow; but you will need to avoid tub baths or any situation where your are submersed in water for the next 4 or 5 days to avoid the risk of infection.     DO NOT DRIVE ON THE DAY OF YOUR PROCEDURE.    If you have any problems or concern please contact your physician's office.      SEEK IMMEDIATE MEDICAL ATTENTION FOR ANY UNCONTROLLED PAIN OR UNUSUAL BRUISING/BLEEDING AT OR NEAR SITE.

## 2023-06-16 ENCOUNTER — TELEPHONE (OUTPATIENT)
Dept: INFUSION THERAPY | Facility: HOSPITAL | Age: 73
End: 2023-06-16
Payer: MEDICARE

## 2023-06-19 LAB
CYTO UR: NORMAL
LAB AP ASPIRATE SMEAR: NORMAL
LAB AP CASE REPORT: NORMAL
LAB AP CBC AND DIFFERENTIAL: NORMAL
LAB AP CLINICAL INFORMATION: NORMAL
LAB AP CLOT SECTION: NORMAL
LAB AP CORE BIOPSY: NORMAL
LAB AP FLOW CYTOMETRY SUMMARY: NORMAL
PATH REPORT.FINAL DX SPEC: NORMAL

## 2023-07-06 LAB
CYTO UR: NORMAL
LAB AP ASPIRATE SMEAR: NORMAL
LAB AP CASE REPORT: NORMAL
LAB AP CBC AND DIFFERENTIAL: NORMAL
LAB AP CLINICAL INFORMATION: NORMAL
LAB AP CLOT SECTION: NORMAL
LAB AP CORE BIOPSY: NORMAL
LAB AP FLOW CYTOMETRY SUMMARY: NORMAL
LAB AP INTEGRATED ONCOLOGY, ADDENDUM: NORMAL
PATH REPORT.FINAL DX SPEC: NORMAL

## 2023-10-13 ENCOUNTER — LAB (OUTPATIENT)
Dept: LAB | Facility: HOSPITAL | Age: 73
End: 2023-10-13
Payer: MEDICARE

## 2023-10-13 DIAGNOSIS — D47.2 SMOLDERING MULTIPLE MYELOMA (SMM): ICD-10-CM

## 2023-10-13 LAB
ALBUMIN SERPL-MCNC: 4.6 G/DL (ref 3.5–5.2)
ALBUMIN/GLOB SERPL: 1.1 G/DL
ALP SERPL-CCNC: 61 U/L (ref 39–117)
ALT SERPL W P-5'-P-CCNC: 9 U/L (ref 1–41)
ANION GAP SERPL CALCULATED.3IONS-SCNC: 7 MMOL/L (ref 5–15)
AST SERPL-CCNC: 16 U/L (ref 1–40)
BASOPHILS # BLD AUTO: 0.01 10*3/MM3 (ref 0–0.2)
BASOPHILS NFR BLD AUTO: 0.5 % (ref 0–1.5)
BILIRUB SERPL-MCNC: 0.5 MG/DL (ref 0–1.2)
BUN SERPL-MCNC: 10 MG/DL (ref 8–23)
BUN/CREAT SERPL: 8.8 (ref 7–25)
CALCIUM SPEC-SCNC: 9.4 MG/DL (ref 8.6–10.5)
CHLORIDE SERPL-SCNC: 103 MMOL/L (ref 98–107)
CO2 SERPL-SCNC: 28 MMOL/L (ref 22–29)
CREAT SERPL-MCNC: 1.13 MG/DL (ref 0.76–1.27)
DEPRECATED RDW RBC AUTO: 46.8 FL (ref 37–54)
EGFRCR SERPLBLD CKD-EPI 2021: 69.1 ML/MIN/1.73
EOSINOPHIL # BLD AUTO: 0.12 10*3/MM3 (ref 0–0.4)
EOSINOPHIL NFR BLD AUTO: 6.6 % (ref 0.3–6.2)
ERYTHROCYTE [DISTWIDTH] IN BLOOD BY AUTOMATED COUNT: 13.5 % (ref 12.3–15.4)
GLOBULIN UR ELPH-MCNC: 4.2 GM/DL
GLUCOSE SERPL-MCNC: 104 MG/DL (ref 65–99)
HCT VFR BLD AUTO: 39 % (ref 37.5–51)
HGB BLD-MCNC: 13 G/DL (ref 13–17.7)
IMM GRANULOCYTES # BLD AUTO: 0 10*3/MM3 (ref 0–0.05)
IMM GRANULOCYTES NFR BLD AUTO: 0 % (ref 0–0.5)
LYMPHOCYTES # BLD AUTO: 1.02 10*3/MM3 (ref 0.7–3.1)
LYMPHOCYTES NFR BLD AUTO: 56 % (ref 19.6–45.3)
MCH RBC QN AUTO: 31 PG (ref 26.6–33)
MCHC RBC AUTO-ENTMCNC: 33.3 G/DL (ref 31.5–35.7)
MCV RBC AUTO: 92.9 FL (ref 79–97)
MONOCYTES # BLD AUTO: 0.2 10*3/MM3 (ref 0.1–0.9)
MONOCYTES NFR BLD AUTO: 11 % (ref 5–12)
NEUTROPHILS NFR BLD AUTO: 0.47 10*3/MM3 (ref 1.7–7)
NEUTROPHILS NFR BLD AUTO: 25.9 % (ref 42.7–76)
PLATELET # BLD AUTO: 243 10*3/MM3 (ref 140–450)
PMV BLD AUTO: 9.2 FL (ref 6–12)
POTASSIUM SERPL-SCNC: 3.9 MMOL/L (ref 3.5–5.2)
PROT SERPL-MCNC: 8.8 G/DL (ref 6–8.5)
RBC # BLD AUTO: 4.2 10*6/MM3 (ref 4.14–5.8)
SODIUM SERPL-SCNC: 138 MMOL/L (ref 136–145)
WBC NRBC COR # BLD: 1.82 10*3/MM3 (ref 3.4–10.8)

## 2023-10-13 PROCEDURE — 84165 PROTEIN E-PHORESIS SERUM: CPT

## 2023-10-13 PROCEDURE — 86334 IMMUNOFIX E-PHORESIS SERUM: CPT

## 2023-10-13 PROCEDURE — 36415 COLL VENOUS BLD VENIPUNCTURE: CPT

## 2023-10-13 PROCEDURE — 80053 COMPREHEN METABOLIC PANEL: CPT

## 2023-10-13 PROCEDURE — 83521 IG LIGHT CHAINS FREE EACH: CPT

## 2023-10-13 PROCEDURE — 85025 COMPLETE CBC W/AUTO DIFF WBC: CPT

## 2023-10-13 PROCEDURE — 82784 ASSAY IGA/IGD/IGG/IGM EACH: CPT

## 2023-10-16 ENCOUNTER — OFFICE VISIT (OUTPATIENT)
Dept: ONCOLOGY | Facility: CLINIC | Age: 73
End: 2023-10-16
Payer: MEDICARE

## 2023-10-16 VITALS
DIASTOLIC BLOOD PRESSURE: 63 MMHG | SYSTOLIC BLOOD PRESSURE: 134 MMHG | TEMPERATURE: 98.3 F | RESPIRATION RATE: 18 BRPM | OXYGEN SATURATION: 99 % | BODY MASS INDEX: 20.45 KG/M2 | HEART RATE: 69 BPM | WEIGHT: 151 LBS | HEIGHT: 72 IN

## 2023-10-16 DIAGNOSIS — D47.2 IGG MONOCLONAL PROTEIN DISORDER: Primary | ICD-10-CM

## 2023-10-16 LAB
ALBUMIN SERPL ELPH-MCNC: 4.2 G/DL (ref 2.9–4.4)
ALBUMIN/GLOB SERPL: 1.1 {RATIO} (ref 0.7–1.7)
ALPHA1 GLOB SERPL ELPH-MCNC: 0.2 G/DL (ref 0–0.4)
ALPHA2 GLOB SERPL ELPH-MCNC: 0.5 G/DL (ref 0.4–1)
B-GLOBULIN SERPL ELPH-MCNC: 0.8 G/DL (ref 0.7–1.3)
GAMMA GLOB SERPL ELPH-MCNC: 2.5 G/DL (ref 0.4–1.8)
GLOBULIN SER-MCNC: 4.1 G/DL (ref 2.2–3.9)
IGA SERPL-MCNC: 29 MG/DL (ref 61–437)
IGG SERPL-MCNC: 2958 MG/DL (ref 603–1613)
IGM SERPL-MCNC: 20 MG/DL (ref 15–143)
INTERPRETATION SERPL IEP-IMP: ABNORMAL
KAPPA LC FREE SER-MCNC: 9.3 MG/L (ref 3.3–19.4)
KAPPA LC FREE/LAMBDA FREE SER: 0.06 {RATIO} (ref 0.26–1.65)
LABORATORY COMMENT REPORT: ABNORMAL
LAMBDA LC FREE SERPL-MCNC: 147.8 MG/L (ref 5.7–26.3)
M PROTEIN SERPL ELPH-MCNC: 2.2 G/DL
PROT SERPL-MCNC: 8.3 G/DL (ref 6–8.5)

## 2023-10-16 PROCEDURE — 3078F DIAST BP <80 MM HG: CPT | Performed by: NURSE PRACTITIONER

## 2023-10-16 PROCEDURE — 1159F MED LIST DOCD IN RCRD: CPT | Performed by: NURSE PRACTITIONER

## 2023-10-16 PROCEDURE — 1126F AMNT PAIN NOTED NONE PRSNT: CPT | Performed by: NURSE PRACTITIONER

## 2023-10-16 PROCEDURE — 3075F SYST BP GE 130 - 139MM HG: CPT | Performed by: NURSE PRACTITIONER

## 2023-10-16 PROCEDURE — 99214 OFFICE O/P EST MOD 30 MIN: CPT | Performed by: NURSE PRACTITIONER

## 2023-10-16 PROCEDURE — 1160F RVW MEDS BY RX/DR IN RCRD: CPT | Performed by: NURSE PRACTITIONER

## 2023-10-16 RX ORDER — SULFAMETHOXAZOLE AND TRIMETHOPRIM 800; 160 MG/1; MG/1
2 TABLET ORAL 2 TIMES DAILY
COMMUNITY
Start: 2023-10-11

## 2023-10-16 NOTE — PROGRESS NOTES
"  PROBLEM LIST:  1. Monoclonal protein diagnosed in 2005 previously followed by Dr. Usama Jones, IgG:   a) Bone marrow biopsy on 03/10/2005 showed a normocellular bone marrow with normal hematopoiesis with no evidence of multiple myeloma or lymphoma, plasma cells 3%.   b) Reevaluation on 09/16/2014 showed a normal CBC, normal calcium, normal kidney function, IgG lambda monoclonal protein of 1.6 g/dL, kappa lambda ratio of 0.15, spot urine immunofixation with IgG lambda.   c) Repeat bone marrow biopsy on 03/01/2016 showed a normocellular marrow with 3 to 5% plasma cells. The plasma cells had a mild relative lambda excess. There was no evidence of amyloid deposition on Congo red stain.  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  D) repeat bone marrow biopsy on 6/15/23 showed about 10% plasma cells in a borderline hypocellular marrow, maturing trilineage hematopoesis  2. Chronic pain.   3. Hypertension.    Subjective      CC: MGUS    HISTORY OF PRESENT ILLNESS:   Jh Bryan returns for follow-up.  Continues to have moderate fatigue.  He denies any recurring infections.  Last week he was diagnosed with acute prostatitis and was started on Bactrim DS 1 tablet twice daily.  He is scheduled to follow-up with Dr. Weber November 3, 2023.  He denies any recurring fevers.  He is eating well.  His weight is stable over the past 6 months.      Objective      /63   Pulse 69   Temp 98.3 °F (36.8 °C)   Resp 18   Ht 182.9 cm (72\")   Wt 68.5 kg (151 lb)   SpO2 99%   BMI 20.48 kg/m²    Vitals:    10/16/23 1015   PainSc: 0-No pain                 Performance Status: 1    General: well appearing male in no acute distress  Neuro: alert and oriented  Extremeties: no lower extremity edema  Skin: no rashes, lesions, bruising, or petechiae  Psych: mood and affect appropriate    I have reexamined the patient and the results are consistent with the previously documented exam. Theodora Paez, JONATAN          Lab Results "   Component Value Date    WBC 1.82 (L) 10/13/2023    HGB 13.0 10/13/2023    HCT 39.0 10/13/2023    MCV 92.9 10/13/2023     10/13/2023     Lab Results   Component Value Date    GLUCOSE 104 (H) 10/13/2023    BUN 10 10/13/2023    CREATININE 1.13 10/13/2023    EGFRIFAFRI 91 11/18/2021    BCR 8.8 10/13/2023    K 3.9 10/13/2023    CO2 28.0 10/13/2023    CALCIUM 9.4 10/13/2023    PROTENTOTREF 8.2 05/19/2023    ALBUMIN 4.6 10/13/2023    LABIL2 1.1 05/19/2023    AST 16 10/13/2023    ALT 9 10/13/2023        Assessment & Plan   Jh Bryan is a 73 y.o. year old male with IgG monoclonal gammopathy who returns for follow up.    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  We will continue to monitor every 3 months.  DELMER and free light chains are still pending.  No myeloma defining events.    Neutropenia: This is chronic.  B12 and folate are normal.  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  As long as he does not develop issues with recurrent infections, we will continue to monitor this.  Reminded him to call with temperature greater than 101.    Fatigue: B12 and TSH are normal.    Follow-up in 3 months with labs.          Theodora Paez, APRN    10/16/2023          CC:

## 2023-10-18 ENCOUNTER — TELEPHONE (OUTPATIENT)
Dept: ONCOLOGY | Facility: CLINIC | Age: 73
End: 2023-10-18
Payer: MEDICARE

## 2023-10-18 NOTE — TELEPHONE ENCOUNTER
I called patient per Dr. Wong to let him know that all the labs have been reviewed and are stable.  Patient verbalized understanding.